# Patient Record
Sex: FEMALE | Race: WHITE | Employment: FULL TIME | URBAN - METROPOLITAN AREA
[De-identification: names, ages, dates, MRNs, and addresses within clinical notes are randomized per-mention and may not be internally consistent; named-entity substitution may affect disease eponyms.]

---

## 2019-02-06 ENCOUNTER — OFFICE VISIT (OUTPATIENT)
Dept: OBGYN CLINIC | Age: 24
End: 2019-02-06

## 2019-02-06 VITALS
WEIGHT: 139 LBS | HEIGHT: 64 IN | DIASTOLIC BLOOD PRESSURE: 80 MMHG | BODY MASS INDEX: 23.73 KG/M2 | SYSTOLIC BLOOD PRESSURE: 110 MMHG

## 2019-02-06 DIAGNOSIS — N87.1 DYSPLASIA OF CERVIX, HIGH GRADE CIN 2: Primary | ICD-10-CM

## 2019-02-06 NOTE — PROGRESS NOTES
Consult for Abnormal Pap    CC: Abnormal Pap Smear    HPI: Ms. Salvatore Parks is a 26 y.o. female presenting with a history of CINII, records reviewed. Onset: 0  Location: cervix  Quality: none  Severity: none  Timing: none  Context: none  Modifying factors: none  Associated symptoms: none      Ob/Gyn Hx:    Patients last menstrual period was 2019. Menses: Regular monthly cycles? no, last 2 days, passage of clots? no, intermenstrual spotting? no.  Contraception: IUD  STI- Denies  SA- yes    Health maintenance:  Pap- abnormal 6 months ago  Gardasil- 3/3 injections per patient  Mammo- not of age  Colonoscopy- not of age    Past Medical History:   Diagnosis Date    Abnormal Pap smear of cervix        History reviewed. No pertinent surgical history. History reviewed. No pertinent family history.     Social History     Socioeconomic History    Marital status:      Spouse name: Not on file    Number of children: Not on file    Years of education: Not on file    Highest education level: Not on file   Social Needs    Financial resource strain: Not on file    Food insecurity - worry: Not on file    Food insecurity - inability: Not on file    Transportation needs - medical: Not on file   [employer]you needs - non-medical: Not on file   Occupational History    Not on file   Tobacco Use    Smoking status: Never Smoker    Smokeless tobacco: Never Used   Substance and Sexual Activity    Alcohol use: No     Frequency: Never    Drug use: No    Sexual activity: No     Partners: Male     Birth control/protection: IUD   Other Topics Concern    Not on file   Social History Narrative    Not on file           No Known Allergies    Review of Systems - History obtained from the patient negative for:  Constitutional: weight loss, fever, night sweats  HEENT: hearing loss, earache, congestion, snoring, sorethroat  CV: chest pain, palpitations, edema  Resp: cough, shortness of breath, wheezing  Breast: breast lumps, nipple discharge, galactorrhea  GI: change in bowel habits, abdominal pain, black or bloody stools  : frequency, dysuria, hematuria, vaginal discharge  MSK: back pain, joint pain, muscle pain  Skin: itching, rash, hives  Neuro: dizziness, headache, confusion, weakness  Psych: anxiety, depression, change in mood  Heme/lymph: bleeding, bruising, pallor      Visit Vitals  /80 (BP 1 Location: Left arm, BP Patient Position: Sitting)   Ht 5' 4\" (1.626 m)   Wt 139 lb (63 kg)   LMP 01/01/2019   BMI 23.86 kg/m²         PHYSICAL EXAMINATION    Constitutional  · Appearance: well-nourished, well developed, alert, in no acute distress    HENT  · Head and Face: appears normal    Neck  · Inspection/Palpation: normal appearance, no masses or tenderness  · Lymph Nodes: no lymphadenopathy present  · Thyroid: gland size normal, nontender, no nodules or masses present on palpation    Chest  · Respiratory Effort: breathing non-labored  · Auscultation: normal breath sounds    Cardiovascular  · Heart:  · Auscultation: regular rate and rhythm without murmur    Gastrointestinal  · Abdominal Examination: abdomen non-tender to palpation, normal bowel sounds, no masses present  · Liver and spleen: no hepatomegaly present, spleen not palpable  · Hernias: no hernias identified    Genitourinary  · External Genitalia: normal appearance for age, no discharge present, no tenderness present, no inflammatory lesions present, no masses present, no atrophy present  · Vagina: normal vaginal vault without central or paravaginal defects, no discharge present, no inflammatory lesions present, no masses present  · Bladder: non-tender to palpation  · Urethra: appears normal  · Cervix: normal  STRINGS PRESENT  · Uterus: normal size, shape and consistency  · Adnexa: no adnexal tenderness present, no adnexal masses present  · Perineum: perineum within normal limits, no evidence of trauma, no rashes or skin lesions present  · Anus: anus within normal limits, no hemorrhoids present  · Inguinal Lymph Nodes: no lymphadenopathy present    Skin  · General Inspection: no rash, no lesions identified    Neurologic/Psychiatric  · Mental Status:  · Orientation: grossly oriented to person, place and time  · Mood and Affect: mood normal, affect appropriate      UPT neg    Assessment/Plan:   Hardik Oneal is a 26 y.o. female presenting with a cervical abnormality with CIN2  She is no longer smoking  Got Gardasil x3      Colposcopy Procedure Note    Chart reviewed for the following:  Laura JARRETT, have reviewed the History, Physical and updated the Allergic reactions for Hardik Oneal. TIME OUT performed immediately prior to start of procedure:  Laura JARRETT, have performed the following reviews on Hardik Oneal prior to the start of the procedure:  * Patient was identified by name and date of birth   * Agreement on procedure being performed was verified  * Risks and Benefits explained to the patient  * Consent was signed and verified     Date of procedure: 2/6/2019  Time: 1500  Procedure performed by: Laura Nation  How tolerated by patient: tolerated the procedure well with no complications  Post Procedural Pain Scale: 0 - No Hurt  Comments: None    Indications: Hardik Oneal is a [de-identified] ,  26 y.o. female PATIENT REFUSED, whose lmp was 1/1/2019. She presents for colposcopy for evaluation of CIN2. After being presented with the risks, benefits and alternatives has she signed a consent for the procedure. She states that she understands the need for the procedure and has no further questions. She was informed that she may experience discomfort. Procedure: She was positioned in the dorsal lithotomy position and a speculum was inserted into the vagina. Dilute acetic acid was applied to the cervix. The colposcope was used to visualize the cervix. The transformation zone was completely visualized.  Biopsies were taken from the cervix, placed in formalin, and sent to pathology. Endocervical curettage was performed. Monsels was applied to the cervix, hemostasis was noted. Findings: This colposcopy was satisfactory. Acetowhite changes were seen throughout. Biopsies were sent from 6 and 12 o'clock. Post Procedure Status: The patient tolerated the procedure well with minimal discomfort. She was observed for 5 minutes and discharged in stable condition. She will be contacted with results/if further treatment is needed.     Signed By: Cameron Sarmiento     February 6, 2019        Dr. Carmen Olivarez MD

## 2019-02-07 LAB
HCG URINE, QL. (POC): NEGATIVE
VALID INTERNAL CONTROL?: YES

## 2019-02-08 LAB
CYTOLOGIST CVX/VAG CYTO: NORMAL
CYTOLOGY CVX/VAG DOC THIN PREP: NORMAL
DX ICD CODE: NORMAL
LABCORP, 190119: NORMAL
Lab: NORMAL
Lab: NORMAL
OTHER STN SPEC: NORMAL
PATH REPORT.FINAL DX SPEC: NORMAL
STAT OF ADQ CVX/VAG CYTO-IMP: NORMAL

## 2019-02-11 ENCOUNTER — TELEPHONE (OUTPATIENT)
Dept: OBGYN CLINIC | Age: 24
End: 2019-02-11

## 2019-02-11 NOTE — TELEPHONE ENCOUNTER
MD Marilee Laguerre LPN   Caller: Unspecified (Today, 11:59 AM)             Its monsels, no appointment needed

## 2019-02-11 NOTE — TELEPHONE ENCOUNTER
Patient calling stating that she had a colposcopy done on 2/6/19 and when she was taking a shower this morning, she noticed a 4 inch long piece of tissue fell out of her vagina. She reports she picked it up, and states it was yellowish and white in color with black edges. Patient explained monsel's and that it may have been a scab that formed on the outside of the cervix after taking the biopsies and patient states that what she felt was not hard, did not feel like a scab and was kind of \"squishy\" like tissue. She is concerned and wants to know if she should be seen. Please advise.

## 2019-02-12 LAB
DX ICD CODE: NORMAL
DX ICD CODE: NORMAL
PATH REPORT.COMMENTS IMP SPEC: NORMAL
PATH REPORT.FINAL DX SPEC: NORMAL
PATH REPORT.GROSS SPEC: NORMAL
PATH REPORT.RELEVANT HX SPEC: NORMAL
PATH REPORT.SITE OF ORIGIN SPEC: NORMAL
PATHOLOGIST NAME: NORMAL
PAYMENT PROCEDURE: NORMAL

## 2019-02-13 ENCOUNTER — TELEPHONE (OUTPATIENT)
Dept: OBGYN CLINIC | Age: 24
End: 2019-02-13

## 2019-02-13 NOTE — PROGRESS NOTES
This RN advised the patient of MD reviewed labs and recommendations. Patient placed on the schedule to be seen and discuss next steps for 2/14/19 isela 10:20am    Patient advised to go to our web site for details regarding the LEEP . Patient verbalized understanding.

## 2019-02-13 NOTE — TELEPHONE ENCOUNTER
MD Diego Perez Mervyn Short, RN   Caller: Unspecified (Today,  1:06 PM)             RN is taking care if this

## 2019-02-13 NOTE — TELEPHONE ENCOUNTER
Call received at 303PM    26year old patient last seen in the office on 2/6/19. Patient states she was asked if she was tested for HPV,    Patient states she found out she has not been tested for HIV. Please review and advise regarding surgical pathology.     Patient wondering is she needs to come back in to be tested for HPV      Please advise

## 2019-02-13 NOTE — PROGRESS NOTES
Notify TARUN 2 on colpo biopsies (meaning HPV is still present), can she come in the office to discuss LEEP procedure

## 2019-02-14 ENCOUNTER — OFFICE VISIT (OUTPATIENT)
Dept: OBGYN CLINIC | Age: 24
End: 2019-02-14

## 2019-02-14 DIAGNOSIS — Z71.89 SURGICAL COUNSELING VISIT: ICD-10-CM

## 2019-02-14 DIAGNOSIS — N87.1 DYSPLASIA OF CERVIX, HIGH GRADE CIN 2: Primary | ICD-10-CM

## 2019-02-14 NOTE — PROGRESS NOTES
Discussed NILM but persistent CIN2 on biopsies. Discussed options per [facility] to repeat colpo and pap in 6 months versus LEEP now. After long discussion of risks, benefits of both, she elects for LEEP. The details of planned LEEP were explained. We discussed the risks, benefits, advantages, disadvantages, and alternatives to surgery. She understands that minimally invasive techniques will be attempted, but possible conversion to more invasive techniques may be necessary. She understands and accepts the possible risks and possible complications of planned surgery, including but not limited to infection, bleeding, damage to surrounding structures requiring repair (including but not limited to bowel, bladder, uterus, fallopian tubes, blood vessels, and ovaries) as well as risks to anesthesia. She understands risks of needing additional procedures. She understands risks of postoperative adhesions. She understands risks of postoperative pain. She understands and accepts the risk of surgical complications, including the remote risk of death or serious disability-that exists with any surgical procedure. She understands that surgery does not guarantee alleviation of symptoms. She has reasonable postoperative expectations, expected hospital course, and anticipated recovery time. I have answered all her questions at this time. Plan to proceed with surgery (our surgery scheduler will contact patient to schedule surgery). Signed By: Tony Rico MD     February 14, 2019      Visit time 15min. More than 50% of my face-to-face time was spend on patient counseling.

## 2019-02-14 NOTE — PATIENT INSTRUCTIONS
Loop Electrosurgical Excision Procedure (LEEP): Before Your Procedure  What is LEEP? Loop electrosurgical excision procedure (LEEP) removes tissue from the cervix. You may have this done if you've had a Pap test that shows tissue that isn't normal.  During LEEP, your doctor will put a tool called a speculum into your vagina. It gently spreads apart the sides of your vagina. This lets your doctor see the cervix and inside the vagina. A special fluid is sometimes put on your cervix to make certain areas easier to see. You may get a shot of medicine to numb the cervix. You may feel a cramp when you have the shot. You may also get pain medicine. Your doctor will put a device with a fine wire loop into your vagina. The doctor uses the heated wire to cut out tissue. You may have mild cramps for several hours after LEEP. A dark brown discharge during the first week is normal. You may have some spotting for about 3 weeks. You should be able to go back to your normal routine in 1 to 3 days. How long it takes you to recover will depend on how much was done. Follow-up care is a key part of your treatment and safety. Be sure to make and go to all appointments, and call your doctor if you are having problems. It's also a good idea to know your test results and keep a list of the medicines you take. What happens before the procedure?   Preparing for the procedure    · Tell your doctor if:  ? You are having your menstrual period. ? You are or might be pregnant. A blood or urine test may be done to see if you are pregnant.     · Do not douche, use tampons, have sexual intercourse, or use vaginal medicines for 24 hours before the test.     · Understand exactly what procedure is planned, along with the risks, benefits, and other options. · Tell your doctors ALL the medicines, vitamins, supplements, and herbal remedies you take.  Some of these can increase the risk of bleeding or interact with anesthesia.     · If you take blood thinners, such as warfarin (Coumadin), clopidogrel (Plavix), or aspirin, be sure to talk to your doctor. He or she will tell you if you should stop taking these medicines before your procedure. Make sure that you understand exactly what your doctor wants you to do.     · Your doctor will tell you which medicines to take or stop before your procedure. You may need to stop taking certain medicines a week or more before the procedure. So talk to your doctor as soon as you can. Procedures can be stressful. This information will help you understand what you can expect. And it will help you safely prepare for your procedure. What happens on the day of the procedure?    · You may eat or drink as you normally do.     · Take a bath or shower before you come in for your procedure. Do not apply lotions, perfumes, deodorants, or nail polish.     · You may want to take a pain reliever, such as ibuprofen (Advil or Motrin), 30 to 60 minutes before you have the procedure. This can help reduce any cramping pain.     · Take off all jewelry and piercings. And take out contact lenses, if you wear them.    At the doctor's office or hospital   · Bring a picture ID.     · You will be kept comfortable and safe by your anesthesia provider. You may get medicine that relaxes you or puts you in a light sleep. The area being worked on will be numb.     · The procedure will take about 15 to 30 minutes. Going home  · You will be given more specific instructions about recovering from your procedure. They will cover things like diet, wound care, follow-up care, driving, and getting back to your normal routine. When should you call your doctor? · You have questions or concerns.     · You don't understand how to prepare for your procedure.     · You become ill before the procedure (such as fever, flu, or a cold).     · You need to reschedule or have changed your mind about having the procedure. Where can you learn more?   Go to http://enrike-april.info/. Enter W669 in the search box to learn more about \"Loop Electrosurgical Excision Procedure (LEEP): Before Your Procedure. \"  Current as of: March 27, 2018  Content Version: 11.9  © 9121-6113 [employer]. Care instructions adapted under license by [employer] (which disclaims liability or warranty for this information). If you have questions about a medical condition or this instruction, always ask your healthcare professional. [place] [address] any warranty or liability for your use of this information.

## 2019-02-26 RX ORDER — BISMUTH SUBSALICYLATE 262 MG
1 TABLET,CHEWABLE ORAL DAILY
COMMUNITY
End: 2019-05-22

## 2019-02-28 ENCOUNTER — TELEPHONE (OUTPATIENT)
Dept: OBGYN CLINIC | Age: 24
End: 2019-02-28

## 2019-02-28 NOTE — TELEPHONE ENCOUNTER
Christiane from Dr. Shahida Rboertson office - PCP - regarding her referral from [facility].   Christiane needed our need address and practice NPI number of the referral.    Information given and she will work on getting the referral.

## 2019-03-04 ENCOUNTER — ANESTHESIA EVENT (OUTPATIENT)
Dept: SURGERY | Age: 24
End: 2019-03-04
Payer: OTHER GOVERNMENT

## 2019-03-05 DIAGNOSIS — N87.1 DYSPLASIA OF CERVIX, HIGH GRADE CIN 2: Primary | ICD-10-CM

## 2019-03-11 ENCOUNTER — HOSPITAL ENCOUNTER (OUTPATIENT)
Age: 24
Setting detail: OUTPATIENT SURGERY
Discharge: HOME | End: 2019-03-11
Attending: OBSTETRICS & GYNECOLOGY | Admitting: OBSTETRICS & GYNECOLOGY
Payer: OTHER GOVERNMENT

## 2019-03-11 ENCOUNTER — ANESTHESIA (OUTPATIENT)
Dept: SURGERY | Age: 24
End: 2019-03-11
Payer: OTHER GOVERNMENT

## 2019-03-11 VITALS
SYSTOLIC BLOOD PRESSURE: 108 MMHG | DIASTOLIC BLOOD PRESSURE: 66 MMHG | TEMPERATURE: 98 F | OXYGEN SATURATION: 96 % | WEIGHT: 138.01 LBS | RESPIRATION RATE: 14 BRPM | HEIGHT: 64 IN | BODY MASS INDEX: 23.56 KG/M2 | HEART RATE: 67 BPM

## 2019-03-11 DIAGNOSIS — N87.1 DYSPLASIA OF CERVIX, HIGH GRADE CIN 2: ICD-10-CM

## 2019-03-11 LAB
HCG UR QL: NEGATIVE
HGB BLD-MCNC: 13.3 G/DL (ref 11.5–16)

## 2019-03-11 PROCEDURE — 85018 HEMOGLOBIN: CPT

## 2019-03-11 PROCEDURE — 74011250636 HC RX REV CODE- 250/636

## 2019-03-11 PROCEDURE — 74011000250 HC RX REV CODE- 250: Performed by: OBSTETRICS & GYNECOLOGY

## 2019-03-11 PROCEDURE — 76060000032 HC ANESTHESIA 0.5 TO 1 HR: Performed by: OBSTETRICS & GYNECOLOGY

## 2019-03-11 PROCEDURE — 77030031139 HC SUT VCRL2 J&J -A: Performed by: OBSTETRICS & GYNECOLOGY

## 2019-03-11 PROCEDURE — 74011250636 HC RX REV CODE- 250/636: Performed by: ANESTHESIOLOGY

## 2019-03-11 PROCEDURE — 77030005537 HC CATH URETH BARD -A: Performed by: OBSTETRICS & GYNECOLOGY

## 2019-03-11 PROCEDURE — 77030009408 HC ELECTRD LP RND UTMD -B: Performed by: OBSTETRICS & GYNECOLOGY

## 2019-03-11 PROCEDURE — 77030018722 HC ELCTRD BALL LEEP UTMD -B: Performed by: OBSTETRICS & GYNECOLOGY

## 2019-03-11 PROCEDURE — 76010000138 HC OR TIME 0.5 TO 1 HR: Performed by: OBSTETRICS & GYNECOLOGY

## 2019-03-11 PROCEDURE — 76210000006 HC OR PH I REC 0.5 TO 1 HR: Performed by: OBSTETRICS & GYNECOLOGY

## 2019-03-11 PROCEDURE — 77030020782 HC GWN BAIR PAWS FLX 3M -B

## 2019-03-11 PROCEDURE — 77030018724 HC ELCTRD LP RND UTMD -B: Performed by: OBSTETRICS & GYNECOLOGY

## 2019-03-11 PROCEDURE — 77030011640 HC PAD GRND REM COVD -A: Performed by: OBSTETRICS & GYNECOLOGY

## 2019-03-11 PROCEDURE — 77030016378 HC TBNG SMK EVAC SRGM -A: Performed by: OBSTETRICS & GYNECOLOGY

## 2019-03-11 PROCEDURE — 77030018836 HC SOL IRR NACL ICUM -A: Performed by: OBSTETRICS & GYNECOLOGY

## 2019-03-11 PROCEDURE — 77030002996 HC SUT SLK J&J -A: Performed by: OBSTETRICS & GYNECOLOGY

## 2019-03-11 PROCEDURE — 88307 TISSUE EXAM BY PATHOLOGIST: CPT

## 2019-03-11 PROCEDURE — 81025 URINE PREGNANCY TEST: CPT

## 2019-03-11 PROCEDURE — 77030032490 HC SLV COMPR SCD KNE COVD -B: Performed by: OBSTETRICS & GYNECOLOGY

## 2019-03-11 PROCEDURE — 76210000021 HC REC RM PH II 0.5 TO 1 HR: Performed by: OBSTETRICS & GYNECOLOGY

## 2019-03-11 RX ORDER — HYDROCODONE BITARTRATE AND ACETAMINOPHEN 5; 325 MG/1; MG/1
1 TABLET ORAL
Qty: 5 TAB | Refills: 0 | Status: SHIPPED | OUTPATIENT
Start: 2019-03-11 | End: 2019-03-13

## 2019-03-11 RX ORDER — FENTANYL CITRATE 50 UG/ML
25 INJECTION, SOLUTION INTRAMUSCULAR; INTRAVENOUS
Status: DISCONTINUED | OUTPATIENT
Start: 2019-03-11 | End: 2019-03-11 | Stop reason: HOSPADM

## 2019-03-11 RX ORDER — DEXAMETHASONE SODIUM PHOSPHATE 4 MG/ML
INJECTION, SOLUTION INTRA-ARTICULAR; INTRALESIONAL; INTRAMUSCULAR; INTRAVENOUS; SOFT TISSUE AS NEEDED
Status: DISCONTINUED | OUTPATIENT
Start: 2019-03-11 | End: 2019-03-11 | Stop reason: HOSPADM

## 2019-03-11 RX ORDER — ACETAMINOPHEN 10 MG/ML
INJECTION, SOLUTION INTRAVENOUS AS NEEDED
Status: DISCONTINUED | OUTPATIENT
Start: 2019-03-11 | End: 2019-03-11 | Stop reason: HOSPADM

## 2019-03-11 RX ORDER — SODIUM CHLORIDE, SODIUM LACTATE, POTASSIUM CHLORIDE, CALCIUM CHLORIDE 600; 310; 30; 20 MG/100ML; MG/100ML; MG/100ML; MG/100ML
125 INJECTION, SOLUTION INTRAVENOUS CONTINUOUS
Status: DISCONTINUED | OUTPATIENT
Start: 2019-03-11 | End: 2019-03-11 | Stop reason: HOSPADM

## 2019-03-11 RX ORDER — OXYCODONE HYDROCHLORIDE 5 MG/1
5 TABLET ORAL AS NEEDED
Status: DISCONTINUED | OUTPATIENT
Start: 2019-03-11 | End: 2019-03-11 | Stop reason: HOSPADM

## 2019-03-11 RX ORDER — DEXTROSE, SODIUM CHLORIDE, SODIUM LACTATE, POTASSIUM CHLORIDE, AND CALCIUM CHLORIDE 5; .6; .31; .03; .02 G/100ML; G/100ML; G/100ML; G/100ML; G/100ML
125 INJECTION, SOLUTION INTRAVENOUS CONTINUOUS
Status: DISCONTINUED | OUTPATIENT
Start: 2019-03-11 | End: 2019-03-11 | Stop reason: HOSPADM

## 2019-03-11 RX ORDER — ONDANSETRON 2 MG/ML
INJECTION INTRAMUSCULAR; INTRAVENOUS AS NEEDED
Status: DISCONTINUED | OUTPATIENT
Start: 2019-03-11 | End: 2019-03-11 | Stop reason: HOSPADM

## 2019-03-11 RX ORDER — PROPOFOL 10 MG/ML
INJECTION, EMULSION INTRAVENOUS AS NEEDED
Status: DISCONTINUED | OUTPATIENT
Start: 2019-03-11 | End: 2019-03-11 | Stop reason: HOSPADM

## 2019-03-11 RX ORDER — SODIUM CHLORIDE 0.9 % (FLUSH) 0.9 %
5-40 SYRINGE (ML) INJECTION EVERY 8 HOURS
Status: DISCONTINUED | OUTPATIENT
Start: 2019-03-11 | End: 2019-03-11 | Stop reason: HOSPADM

## 2019-03-11 RX ORDER — MORPHINE SULFATE 10 MG/ML
2 INJECTION, SOLUTION INTRAMUSCULAR; INTRAVENOUS
Status: DISCONTINUED | OUTPATIENT
Start: 2019-03-11 | End: 2019-03-11 | Stop reason: HOSPADM

## 2019-03-11 RX ORDER — MIDAZOLAM HYDROCHLORIDE 1 MG/ML
1 INJECTION, SOLUTION INTRAMUSCULAR; INTRAVENOUS
Status: DISCONTINUED | OUTPATIENT
Start: 2019-03-11 | End: 2019-03-11 | Stop reason: HOSPADM

## 2019-03-11 RX ORDER — IBUPROFEN 800 MG/1
800 TABLET ORAL
Qty: 40 TAB | Refills: 1 | Status: SHIPPED | OUTPATIENT
Start: 2019-03-11 | End: 2019-05-22

## 2019-03-11 RX ORDER — DIPHENHYDRAMINE HYDROCHLORIDE 50 MG/ML
12.5 INJECTION, SOLUTION INTRAMUSCULAR; INTRAVENOUS AS NEEDED
Status: DISCONTINUED | OUTPATIENT
Start: 2019-03-11 | End: 2019-03-11 | Stop reason: HOSPADM

## 2019-03-11 RX ORDER — MIDAZOLAM HYDROCHLORIDE 1 MG/ML
1 INJECTION, SOLUTION INTRAMUSCULAR; INTRAVENOUS AS NEEDED
Status: DISCONTINUED | OUTPATIENT
Start: 2019-03-11 | End: 2019-03-11 | Stop reason: HOSPADM

## 2019-03-11 RX ORDER — MIDAZOLAM HYDROCHLORIDE 1 MG/ML
INJECTION, SOLUTION INTRAMUSCULAR; INTRAVENOUS AS NEEDED
Status: DISCONTINUED | OUTPATIENT
Start: 2019-03-11 | End: 2019-03-11 | Stop reason: HOSPADM

## 2019-03-11 RX ORDER — FENTANYL CITRATE 50 UG/ML
INJECTION, SOLUTION INTRAMUSCULAR; INTRAVENOUS AS NEEDED
Status: DISCONTINUED | OUTPATIENT
Start: 2019-03-11 | End: 2019-03-11 | Stop reason: HOSPADM

## 2019-03-11 RX ORDER — SODIUM CHLORIDE, SODIUM LACTATE, POTASSIUM CHLORIDE, CALCIUM CHLORIDE 600; 310; 30; 20 MG/100ML; MG/100ML; MG/100ML; MG/100ML
INJECTION, SOLUTION INTRAVENOUS
Status: DISCONTINUED | OUTPATIENT
Start: 2019-03-11 | End: 2019-03-11 | Stop reason: HOSPADM

## 2019-03-11 RX ORDER — FENTANYL CITRATE 50 UG/ML
50 INJECTION, SOLUTION INTRAMUSCULAR; INTRAVENOUS AS NEEDED
Status: DISCONTINUED | OUTPATIENT
Start: 2019-03-11 | End: 2019-03-11 | Stop reason: HOSPADM

## 2019-03-11 RX ORDER — LIDOCAINE HYDROCHLORIDE 10 MG/ML
0.5 INJECTION, SOLUTION EPIDURAL; INFILTRATION; INTRACAUDAL; PERINEURAL AS NEEDED
Status: DISCONTINUED | OUTPATIENT
Start: 2019-03-11 | End: 2019-03-11 | Stop reason: HOSPADM

## 2019-03-11 RX ORDER — SODIUM CHLORIDE 0.9 % (FLUSH) 0.9 %
5-40 SYRINGE (ML) INJECTION AS NEEDED
Status: DISCONTINUED | OUTPATIENT
Start: 2019-03-11 | End: 2019-03-11 | Stop reason: HOSPADM

## 2019-03-11 RX ORDER — ONDANSETRON 2 MG/ML
4 INJECTION INTRAMUSCULAR; INTRAVENOUS AS NEEDED
Status: DISCONTINUED | OUTPATIENT
Start: 2019-03-11 | End: 2019-03-11 | Stop reason: HOSPADM

## 2019-03-11 RX ORDER — KETOROLAC TROMETHAMINE 30 MG/ML
INJECTION, SOLUTION INTRAMUSCULAR; INTRAVENOUS AS NEEDED
Status: DISCONTINUED | OUTPATIENT
Start: 2019-03-11 | End: 2019-03-11 | Stop reason: HOSPADM

## 2019-03-11 RX ORDER — FERRIC SUBSULFATE 20-22G/100
SOLUTION, NON-ORAL MISCELLANEOUS AS NEEDED
Status: DISCONTINUED | OUTPATIENT
Start: 2019-03-11 | End: 2019-03-11 | Stop reason: HOSPADM

## 2019-03-11 RX ORDER — LIDOCAINE HYDROCHLORIDE 20 MG/ML
INJECTION, SOLUTION EPIDURAL; INFILTRATION; INTRACAUDAL; PERINEURAL AS NEEDED
Status: DISCONTINUED | OUTPATIENT
Start: 2019-03-11 | End: 2019-03-11 | Stop reason: HOSPADM

## 2019-03-11 RX ADMIN — DEXAMETHASONE SODIUM PHOSPHATE 6 MG: 4 INJECTION, SOLUTION INTRA-ARTICULAR; INTRALESIONAL; INTRAMUSCULAR; INTRAVENOUS; SOFT TISSUE at 07:40

## 2019-03-11 RX ADMIN — SODIUM CHLORIDE, SODIUM LACTATE, POTASSIUM CHLORIDE, CALCIUM CHLORIDE: 600; 310; 30; 20 INJECTION, SOLUTION INTRAVENOUS at 07:21

## 2019-03-11 RX ADMIN — FENTANYL CITRATE 25 MCG: 50 INJECTION INTRAMUSCULAR; INTRAVENOUS at 08:33

## 2019-03-11 RX ADMIN — FENTANYL CITRATE 25 MCG: 50 INJECTION INTRAMUSCULAR; INTRAVENOUS at 08:19

## 2019-03-11 RX ADMIN — ONDANSETRON 4 MG: 2 INJECTION INTRAMUSCULAR; INTRAVENOUS at 08:02

## 2019-03-11 RX ADMIN — MIDAZOLAM HYDROCHLORIDE 2 MG: 1 INJECTION, SOLUTION INTRAMUSCULAR; INTRAVENOUS at 07:21

## 2019-03-11 RX ADMIN — PROPOFOL 150 MG: 10 INJECTION, EMULSION INTRAVENOUS at 07:31

## 2019-03-11 RX ADMIN — KETOROLAC TROMETHAMINE 30 MG: 30 INJECTION, SOLUTION INTRAMUSCULAR; INTRAVENOUS at 08:01

## 2019-03-11 RX ADMIN — PROPOFOL 30 MG: 10 INJECTION, EMULSION INTRAVENOUS at 07:21

## 2019-03-11 RX ADMIN — ACETAMINOPHEN 1000 MG: 10 INJECTION, SOLUTION INTRAVENOUS at 07:29

## 2019-03-11 RX ADMIN — FENTANYL CITRATE 50 MCG: 50 INJECTION, SOLUTION INTRAMUSCULAR; INTRAVENOUS at 07:40

## 2019-03-11 RX ADMIN — SODIUM CHLORIDE, SODIUM LACTATE, POTASSIUM CHLORIDE, AND CALCIUM CHLORIDE 125 ML/HR: 600; 310; 30; 20 INJECTION, SOLUTION INTRAVENOUS at 06:30

## 2019-03-11 RX ADMIN — FENTANYL CITRATE 50 MCG: 50 INJECTION, SOLUTION INTRAMUSCULAR; INTRAVENOUS at 07:30

## 2019-03-11 RX ADMIN — LIDOCAINE HYDROCHLORIDE 40 MG: 20 INJECTION, SOLUTION EPIDURAL; INFILTRATION; INTRACAUDAL; PERINEURAL at 07:31

## 2019-03-11 NOTE — ROUTINE PROCESS
Patient: Miguel Dee MRN: [id number]  SSN: [id number]   YOB: 1995  Age: 26 y.o. Sex: female     Patient is status post Procedure(s):  LOOP ELECTRODE EXCISION PROCEDURE OF CERVIX LEEP IUD REMOVAL.     Surgeon(s) and Role:     * Dae Kidd MD - Primary    Local/Dose/Irrigation:                   Peripheral IV 03/11/19 Left Arm (Active)   Dressing Status Clean, dry, & intact 3/11/2019  6:25 AM   Dressing Type Transparent 3/11/2019  6:25 AM   Hub Color/Line Status Infusing 3/11/2019  6:25 AM            Airway - Endotracheal Tube 03/11/19 (Active)                   Dressing/Packing:  Wound Perineum-Dressing Type : Alecia-pad (03/11/19 0700)  Splint/Cast:  ]    Other:

## 2019-03-11 NOTE — H&P
Gynecology History and Physical    Name: Kenna Arceo MRN: [id number] [id number]: [id number]    YOB: 1995  Age: 26 y.o. Sex: female       Subjective:      Chief complaint:  TARUN 2    Reza is a 26 y.o.  female with a history of TARUN 2. She is admitted for Procedure(s) (LRB):  LOOP ELECTRODE EXCISION PROCEDURE OF CERVIX LEEP (N/A). OB History      Para Term  AB Living    0 0 0 0 0 0    SAB TAB Ectopic Molar Multiple Live Births    0 0 0 0 0 0        Past Medical History:   Diagnosis Date    Abnormal Pap smear of cervix     Ill-defined condition     DEPRESSION AND ANXIETY    Ill-defined condition     LOWER BACK ISSUES     History reviewed. No pertinent surgical history. Social History     Occupational History    Not on file   Tobacco Use    Smoking status: Former Smoker     Last attempt to quit: 10/2018     Years since quittin.4    Smokeless tobacco: Never Used   Substance and Sexual Activity    Alcohol use: No     Frequency: Never    Drug use: No    Sexual activity: No     Partners: Male     Birth control/protection: IUD     Family History   Problem Relation Age of Onset    No Known Problems Mother     No Known Problems Father     No Known Problems Sister     No Known Problems Sister         No Known Allergies  Prior to Admission medications    Medication Sig Start Date End Date Taking? Authorizing Provider   multivitamin (ONE A DAY) tablet Take 1 Tab by mouth daily. Yes Provider, Historical        Review of Systems:  A comprehensive review of systems was negative except for that written in the History of Present Illness. Objective:     Vitals:    19 1544 19 0612   BP:  107/63   Pulse:  82   Resp:  12   Temp:  98.7 °F (37.1 °C)   SpO2:  98%   Weight: 138 lb (62.6 kg) 138 lb 0.1 oz (62.6 kg)   Height: 5' 4\" (1.626 m) 5' 4\" (1.626 m)       Physical Exam:  Patient without distress.   Heart: Regular rate and rhythm or S1S2 present  Lung: clear to auscultation throughout lung fields, no wheezes, no rales, no rhonchi and normal respiratory effort  Abdomen: soft, nontender    Assessment:     TARUN 2    Plan:     Procedure(s) (LRB):  LOOP ELECTRODE EXCISION PROCEDURE OF CERVIX LEEP (N/A)  Discussed the risks of surgery including the risks of bleeding, infection, deep vein thrombosis, and surgical injuries to internal organs including but not limited to the bowels, bladder, rectum, and female reproductive organs. The patient understands the risks; any and all questions were answered to the patient's satisfaction.     Signed By:  Thomas Burton MD     March 11, 2019

## 2019-03-11 NOTE — ANESTHESIA PREPROCEDURE EVALUATION
Anesthetic History   No history of anesthetic complications            Review of Systems / Medical History  Patient summary reviewed, nursing notes reviewed and pertinent labs reviewed    Pulmonary  Within defined limits        Smoker         Neuro/Psych   Within defined limits           Cardiovascular  Within defined limits                     GI/Hepatic/Renal  Within defined limits              Endo/Other  Within defined limits           Other Findings              Physical Exam    Airway  Mallampati: II  TM Distance: > 6 cm  Neck ROM: normal range of motion   Mouth opening: Normal     Cardiovascular  Regular rate and rhythm,  S1 and S2 normal,  no murmur, click, rub, or gallop             Dental  No notable dental hx       Pulmonary  Breath sounds clear to auscultation               Abdominal  GI exam deferred       Other Findings            Anesthetic Plan    ASA: 2  Anesthesia type: general          Induction: Intravenous  Anesthetic plan and risks discussed with: Patient

## 2019-03-11 NOTE — PROCEDURES
LEEP Operative Note    Date of Procedure: 3/11/2019   Preoperative Diagnosis: DYSPLASIA OF CERVIX , HIGHGRADE CIN2  Postoperative Diagnosis: DYSPLASIA OF CERVIX , HIGHGRADE CIN2    Procedure(s):  LOOP ELECTRODE EXCISION PROCEDURE OF CERVIX (LEEP) and IUD REMOVAL  Surgeon(s) and Role:     * Linda Murguia MD - Primary         Assistant Staff: None  Surgical Staff:  Circ-1: Joshua Espino RN  Scrub Tech-1: Rose Marie GRAHAM  Event Time In Time Out   Incision Start 3450    Incision Close 0802        Findings: normal appearing cervix, IUD strings at cervical os  Anesthesia: General   Complications: none  Estimated Blood Loss: 5cc  Specimens:   ID Type Source Tests Collected by Time Destination   1 : CERVICAL EXCISION  Fresh Cervical  Linda Murguia MD 3/11/2019 5908 Pathology      Implants: None    Procedure Detail:  The patient was taken to the operating room where she was placed in the supine position. After undergoing adequate anesthesia, she was placed up in the Yellow-fin stirrups in the dorsal lithotomy position. The patient was then prepped and draped in the usual fashion. Time-out was performed. I&O catheter was used to drain the bladder.      Attention was first turned to the vagina where insulated speculum was placed. Cervix was visualized, lugols was applied. The anterior lip of the cervix was grasped with a single tooth tenaculum. Attempts were made to displace IUD strings but were unsuccessful. Ring forceps were used to grasp IUD strings and remove IUD. Using a loop electrode that encompassed the entire lesion, excision of the ectocervix was done with every effort made to remove the entire lesion in one specimen. Rollerball cautery was used to obtain hemostasis. Monsels was applied.     All instruments were removed from the vagina including the Paul catheter.  The patient was awakened, extubated and taken to the recovery room in good condition    Signed By: Usama Cartagena MD     March 11, 2019

## 2019-03-11 NOTE — ANESTHESIA POSTPROCEDURE EVALUATION
Procedure(s):  LOOP ELECTRODE EXCISION PROCEDURE OF CERVIX LEEP IUD REMOVAL. Anesthesia Post Evaluation        Patient location during evaluation: PACU  Patient participation: complete - patient participated  Level of consciousness: awake and alert  Pain management: adequate  Airway patency: patent  Anesthetic complications: no  Cardiovascular status: acceptable  Respiratory status: acceptable  Hydration status: acceptable  Comments: I have seen and evaluated the patient and is ready for discharge.  Mignon Gaston MD    Post anesthesia nausea and vomiting:  none      Visit Vitals  /71   Pulse 71   Temp 36.7 °C (98 °F)   Resp 12   Ht 5' 4\" (1.626 m)   Wt 62.6 kg (138 lb 0.1 oz)   SpO2 98%   BMI 23.69 kg/m²

## 2019-03-11 NOTE — DISCHARGE INSTRUCTIONS
Patient Education      You had motrin at 8 am. Next dose is at 4 pm     Loop Electrosurgical Excision Procedure (LEEP): What to Expect at [facility] may have mild cramping for several hours after the procedure. A dark brown vaginal discharge during the first week is normal. You can use a sanitary pad for the bleeding. You may also have some spotting for about 3 weeks. How long it takes to recover will depend on how much was done during the procedure. This care sheet gives you a general idea about how long it will take for you to recover. But each person recovers at a different pace. Follow the steps below to feel better as quickly as possible. How can you care for yourself at home? Activity    · You should be able to go back to your normal activities in 1 to 3 days. Medicines    · Your doctor will tell you if and when you can restart your medicines. He or she will also give you instructions about taking any new medicines.     · If you take blood thinners, such as warfarin (Coumadin), clopidogrel (Plavix), or aspirin, be sure to talk to your doctor. He or she will tell you if and when to start taking those medicines again. Make sure that you understand exactly what your doctor wants you to do.     · Take an over-the-counter pain medicine, such as acetaminophen (Tylenol), ibuprofen (Advil, Motrin), or naproxen (Aleve). Read and follow all instructions on the label.     · Do not take two or more pain medicines at the same time unless the doctor told you to. Many pain medicines have acetaminophen, which is Tylenol. Too much acetaminophen (Tylenol) can be harmful. Exercise    · Do not exercise for 1 to 3 days after the procedure. Other instructions    · Use a sanitary pad if you have bleeding.     · Do not have sexual intercourse or use tampons for 3 weeks. Do not douche. This will allow your cervix to heal.     · You can take a shower anytime after the procedure.  Ask your doctor when it is okay to take a bath.     · Be sure to have regular follow-up Pap tests. Your doctor can tell you how often you need to have Pap tests. Follow-up care is a key part of your treatment and safety. Be sure to make and go to all appointments, and call your doctor if you are having problems. It's also a good idea to know your test results and keep a list of the medicines you take. When should you call for help? Call 911 anytime you think you may need emergency care. For example, call if:    · You passed out (lost consciousness).     · You have chest pain, are short of breath, or cough up blood.    Call your doctor now or seek immediate medical care if:    · You have pain that does not get better after you take pain medicine.     · You cannot pass stools or gas.     · You have signs of infection, such as:  ? Increased pain, swelling, warmth, or redness. ? A fever.     · You have bright red vaginal bleeding that soaks one or more pads in an hour, or you have large clots.     · You have vaginal discharge that has increased in amount or smells bad.     · You are sick to your stomach or cannot drink fluids.     · You have signs of a blood clot in your leg (called a deep vein thrombosis), such as:  ? Pain in your calf, back of the knee, thigh, or groin. ? Redness or swelling in your leg.    Watch closely for any changes in your health, and be sure to contact your doctor if you have any problems. Where can you learn more? Go to http://enrike-april.info/. Enter ([phone] in the search box to learn more about \"Loop Electrosurgical Excision Procedure (LEEP): What to Expect at Home. \"  Current as of: March 27, 2018  Content Version: 11.9  © 7487-2115 [employer]. Care instructions adapted under license by [employer] (which disclaims liability or warranty for this information).  If you have questions about a medical condition or this instruction, always ask your healthcare professional. [employer] disclaims any warranty or liability for your use of this information. DISCHARGE SUMMARY from Nurse    PATIENT INSTRUCTIONS:    After general anesthesia or intravenous sedation, for 24 hours or while taking prescription Narcotics:  · Limit your activities  · Do not drive and operate hazardous machinery  · Do not make important personal or business decisions  · Do  not drink alcoholic beverages  · If you have not urinated within 8 hours after discharge, please contact your surgeon on call. Report the following to your surgeon:  · Excessive pain, swelling, redness or odor of or around the surgical area  · Temperature over 100.5  · Nausea and vomiting lasting longer than 4 hours or if unable to take medications  · Any signs of decreased circulation or nerve impairment to extremity: change in color, persistent  numbness, tingling, coldness or increase pain  · Any questions    What to do at Home:  Recommended activity: Activity as tolerated and no driving for today and No driving while on analgesics,     If you experience any of the following symptoms fever, foul smelling vaginal drainage, bleeding- filling 2 pads and hour or large clots, please follow up with Dr Missy Romero. *  Please give a list of your current medications to your Primary Care Provider. *  Please update this list whenever your medications are discontinued, doses are      changed, or new medications (including over-the-counter products) are added. *  Please carry medication information at all times in case of emergency situations. These are general instructions for a healthy lifestyle:    No smoking/ No tobacco products/ Avoid exposure to second hand smoke  Surgeon General's Warning:  Quitting smoking now greatly reduces serious risk to your health.     Obesity, smoking, and sedentary lifestyle greatly increases your risk for illness    A healthy diet, regular physical exercise & weight monitoring are important for maintaining a healthy lifestyle    You may be retaining fluid if you have a history of heart failure or if you experience any of the following symptoms:  Weight gain of 3 pounds or more overnight or 5 pounds in a week, increased swelling in our hands or feet or shortness of breath while lying flat in bed. Please call your doctor as soon as you notice any of these symptoms; do not wait until your next office visit. Recognize signs and symptoms of STROKE:    F-face looks uneven    A-arms unable to move or move unevenly    S-speech slurred or non-existent    T-time-call 911 as soon as signs and symptoms begin-DO NOT go       Back to bed or wait to see if you get better-TIME IS BRAIN. Warning Signs of HEART ATTACK     Call 911 if you have these symptoms:   Chest discomfort. Most heart attacks involve discomfort in the center of the chest that lasts more than a few minutes, or that goes away and comes back. It can feel like uncomfortable pressure, squeezing, fullness, or pain.  Discomfort in other areas of the upper body. Symptoms can include pain or discomfort in one or both arms, the back, neck, jaw, or stomach.  Shortness of breath with or without chest discomfort.  Other signs may include breaking out in a cold sweat, nausea, or lightheadedness. Don't wait more than five minutes to call 911 - MINUTES MATTER! Fast action can save your life. Calling 911 is almost always the fastest way to get lifesaving treatment. Emergency Medical Services staff can begin treatment when they arrive -- up to an hour sooner than if someone gets to the hospital by car. The discharge information has been reviewed with the patient. The patient verbalized understanding.   Discharge medications reviewed with the patient and appropriate educational materials and side effects teaching were provided.   ___________________________________________________________________________________________________________________________________

## 2019-03-14 ENCOUNTER — TELEPHONE (OUTPATIENT)
Dept: OBGYN CLINIC | Age: 24
End: 2019-03-14

## 2019-03-20 NOTE — PROGRESS NOTES
Patient advised of MD reviewed labs and recommendations and patient placed on the schedule to have IUD insertion on 4/29/19 at 10:00am    Patient advised to observe no unprotected intercourse for 2 weeks prior to the insertion and to take motrin prior to the procedure. Patient verbalized understanding.

## 2019-03-25 ENCOUNTER — TELEPHONE (OUTPATIENT)
Dept: OBGYN CLINIC | Age: 24
End: 2019-03-25

## 2019-03-25 NOTE — TELEPHONE ENCOUNTER
Patient of Gulshan Ley. She was in the OR for Leep on 3/11/19 and had her IUD removed at the same time. She said that she developed bleeding, as expected after the Leep. She has continued to have bright red bleeding like a period. She has been advised that this may be from the removal of the hormones from the IUD. She said that she has been crampy, bloated, bleeding through a regular absorbancy pad every 24 hours. Bleeding is not heavy. No clots, no pieces of tissue noted. She said that she was given Motrin 800 mg and has only taken one tablet. She and I discussed that she could try taking the Motrin every 8 hours (1 tab) with food around the clock for 72 hours to decrease the pain and bleeding. Anything else I needed to add for her?

## 2019-03-26 NOTE — TELEPHONE ENCOUNTER
Joelle Alvarez MD  [facility] Tommy CADE, LPN 17 minutes ago (0:15 AM)      Agree, thx    Routing comment

## 2019-04-29 ENCOUNTER — OFFICE VISIT (OUTPATIENT)
Dept: OBGYN CLINIC | Age: 24
End: 2019-04-29

## 2019-04-29 VITALS
WEIGHT: 134.38 LBS | SYSTOLIC BLOOD PRESSURE: 110 MMHG | DIASTOLIC BLOOD PRESSURE: 72 MMHG | BODY MASS INDEX: 22.94 KG/M2 | HEIGHT: 64 IN

## 2019-04-29 DIAGNOSIS — Z30.430 ENCOUNTER FOR IUD INSERTION: Primary | ICD-10-CM

## 2019-04-29 DIAGNOSIS — Z30.430 VISIT FOR INSERTION OF INTRAUTERINE DEVICE: ICD-10-CM

## 2019-04-29 LAB
HCG URINE, QL. (POC): NEGATIVE
VALID INTERNAL CONTROL?: YES

## 2019-04-29 NOTE — PROGRESS NOTES
IUD INSERTION NOTE    Chart reviewed for the following:  I have reviewed the History & Physical and updated Reza Severino allergies. TIME OUT performed immediately prior to start of procedure:  I performed the following reviews on Suzie Alba prior to the start of the procedure:  Patient was identified by name and date of birth   Agreement on procedure being performed was verified  Risks and Benefits explained to the patient  Consent was signed and verified  Time: 1030  Date of procedure: 2019  Procedure performed by: Dr. Kusum Wills  How tolerated by patient: Well  Post Procedural Pain Scale: 2-Hurts Minimally  Comments: None    Indications:  Suzie Alba is a 22 y.o., , whose Patient's last menstrual period was 03/15/2019., presents for insertion of an IUD. The risks, benefits and alternatives of IUD insertion were discussed in detail at last visit and at this visit. She also has reviewed IUD information herself. She has elected to proceed with the insertion today and she states she has no further questions. She signed the consent form. A urine pregnancy test was negative. Procedure:  She was placed in dorsal lithotomy position. On bimanual exam the uterus was anteverted and normal in size with no tenderness present. A speculum was inserted into the vagina and the cervix was visualized. The anterior lip of the cervix was grasped with a single toothed tenaculum. The uterus was sounded with a Dietz sound to 7 centimeters. A Mirena IUD was then inserted without difficulty per  instructions. The string was cut to 3 centimeters. She experienced a mild amount of cramping. Post Procedure Status:   She tolerated the procedure with mild discomfort. The patient was observed for 10 minutes after the insertion. There were no complications. Patient was discharged in stable condition. She understands the IUD will not protect from sexually transmitted diseases including HIV.  She also understands she may have heavier/irregular bleeding for for the first 3-6 months with her IUD but that periods generally diminish in amount thereafter. She also understands the IUD must be removed before 5 years from today. She will contact us if she experiences  Any significant or increasing bleeding, pain, fever, shaking, chills or any other concerning signs or symptoms. She will also contact us or seek care immediately if she is or thinks she is pregnant. See order report for IUD lot number.      Tray Barney MD

## 2019-04-29 NOTE — PATIENT INSTRUCTIONS
Intrauterine Device (IUD) Insertion: Care Instructions  Your Care Instructions    The intrauterine device (IUD) is a very effective method of birth control. It is a small, plastic, T-shaped device that contains copper or hormones. The doctor inserts the IUD into your uterus. A plastic string tied to the end of the IUD hangs down through the cervix into the vagina. There are two types of IUDs. The copper IUD is effective for up to 10 years. The hormonal IUD is effective for either 3 years or 5 years, depending on which IUD is used. The hormonal IUD also reduces menstrual bleeding and cramping. Both types of IUD damage or kill the man's sperm. This means that the woman's egg does not join with the sperm. IUDs also change the lining of the uterus so that the egg does not lodge there. The IUD is most likely to work well for women who have been pregnant before. Some women who have never been pregnant have more trouble keeping the IUD in the uterus. They also may have more pain and cramping after insertion. Follow-up care is a key part of your treatment and safety. Be sure to make and go to all appointments, and call your doctor if you are having problems. It's also a good idea to know your test results and keep a list of the medicines you take. How can you care for yourself at home? · You may experience some mild cramping and light bleeding (spotting) for 1 or 2 days. Use a hot water bottle or a heating pad set on low on your belly for pain. · Take an over-the-counter pain medicine, such as acetaminophen (Tylenol), ibuprofen (Advil, Motrin), and naproxen (Aleve) if needed. Read and follow all instructions on the label. · Do not take two or more pain medicines at the same time unless the doctor told you to. Many pain medicines have acetaminophen, which is Tylenol. Too much acetaminophen (Tylenol) can be harmful. · Check the string of your IUD after every period.  To do this, insert a finger into your vagina and feel for the cervix, which is at the top of the vagina and feels harder than the rest of your vagina. You should be able to feel the thin, plastic string coming out of the opening of your cervix. If you cannot feel the string, use another form of birth control and make an appointment with your doctor to have the string checked. · If the IUD comes out, save it and call your doctor. Be sure to use another form of birth control while the IUD is out. · Use latex condoms to protect against sexually transmitted infections (STIs), such as gonorrhea and chlamydia. An IUD does not protect you from STIs. Having one sex partner (who does not have STIs and does not have sex with anyone else) is a good way to avoid STIs. When should you call for help? Call 911 anytime you think you may need emergency care. For example, call if:    · You passed out (lost consciousness).     · You have sudden, severe pain in your belly or pelvis.    Call your doctor now or seek immediate medical care if:    · You have new belly or pelvic pain.     · You have severe vaginal bleeding. This means that you are soaking through your usual pads or tampons each hour for 2 or more hours.     · You are dizzy or lightheaded, or you feel like you may faint.     · You have a fever and pelvic pain or vaginal discharge.     · You have pelvic pain that is getting worse.    Watch closely for changes in your health, and be sure to contact your doctor if:    · You cannot feel the string, or the IUD comes out.     · You feel sick to your stomach, or you vomit.     · You think you may be pregnant. Where can you learn more? Go to http://enrike-april.info/. Enter V507 in the search box to learn more about \"Intrauterine Device (IUD) Insertion: Care Instructions. \"  Current as of: September 5, 2018  Content Version: 11.9  © 4151-1214 [employer].  Care instructions adapted under license by [employer] (which disclaims liability or warranty for this information). If you have questions about a medical condition or this instruction, always ask your healthcare professional. [place] [address] any warranty or liability for your use of this information.

## 2019-05-22 ENCOUNTER — TELEPHONE (OUTPATIENT)
Dept: OBGYN CLINIC | Age: 24
End: 2019-05-22

## 2019-05-22 ENCOUNTER — OFFICE VISIT (OUTPATIENT)
Dept: OBGYN CLINIC | Age: 24
End: 2019-05-22

## 2019-05-22 VITALS
BODY MASS INDEX: 23.29 KG/M2 | SYSTOLIC BLOOD PRESSURE: 104 MMHG | WEIGHT: 136.4 LBS | DIASTOLIC BLOOD PRESSURE: 66 MMHG | HEIGHT: 64 IN

## 2019-05-22 DIAGNOSIS — R30.0 DYSURIA: Primary | ICD-10-CM

## 2019-05-22 LAB
BILIRUB UR QL STRIP: NEGATIVE
GLUCOSE UR-MCNC: NEGATIVE MG/DL
KETONES P FAST UR STRIP-MCNC: NEGATIVE MG/DL
PH UR STRIP: 6.5 [PH] (ref 4.6–8)
PROT UR QL STRIP: NEGATIVE
SP GR UR STRIP: 1.01 (ref 1–1.03)
UA UROBILINOGEN AMB POC: NORMAL (ref 0.2–1)
URINALYSIS CLARITY POC: CLEAR
URINALYSIS COLOR POC: YELLOW
URINE BLOOD POC: NORMAL
URINE LEUKOCYTES POC: NEGATIVE
URINE NITRITES POC: NEGATIVE

## 2019-05-22 NOTE — PROGRESS NOTES
Urinary Complaints    CC: burning with urination    HPI: Ms. Oneal Washington is a 22 y.o.  who presents with a history of urinary pain, urgency, frequency. She has not been evaluated for current complaints. She does not have a history of frequent/recurrent UTIs. Also reports spotting since surgery. Symptoms began 2 days ago. Severity? 3 on a scale of 1-10  Timing: acute. There are no aggravating factors. There are no alleviating factors. Modifying factors: none    Other associated symptoms:   Denies changes in bowel or bladder function. Denies fevers/chills. Denies dyspareunia.     Past Medical History:   Diagnosis Date    Abnormal Pap smear of cervix     Ill-defined condition     DEPRESSION AND ANXIETY    Ill-defined condition     LOWER BACK ISSUES       History reviewed. No pertinent surgical history.     Family History   Problem Relation Age of Onset    No Known Problems Mother     No Known Problems Father     No Known Problems Sister     No Known Problems Sister        Social History     Socioeconomic History    Marital status:      Spouse name: Not on file    Number of children: Not on file    Years of education: Not on file    Highest education level: Not on file   Occupational History    Not on file   Social Needs    Financial resource strain: Not on file    Food insecurity:     Worry: Not on file     Inability: Not on file    Transportation needs:     Medical: Not on file     Non-medical: Not on file   Tobacco Use    Smoking status: Current Every Day Smoker     Packs/day: 0.25     Last attempt to quit: 10/2018     Years since quittin.6    Smokeless tobacco: Never Used   Substance and Sexual Activity    Alcohol use: No     Frequency: Never    Drug use: No    Sexual activity: Yes     Partners: Male     Birth control/protection: IUD   Lifestyle    Physical activity:     Days per week: Not on file     Minutes per session: Not on file    Stress: Not on file   Relationships    Social connections:     Talks on phone: Not on file     Gets together: Not on file     Attends Worship service: Not on file     Active member of club or organization: Not on file     Attends meetings of clubs or organizations: Not on file     Relationship status: Not on file    Intimate partner violence:     Fear of current or ex partner: Not on file     Emotionally abused: Not on file     Physically abused: Not on file     Forced sexual activity: Not on file   Other Topics Concern    Not on file   Social History Narrative    Not on file       Current Outpatient Medications   Medication Sig Dispense Refill    levonorgestrel (MIRENA) 20 mcg/24 hours (5 yrs) 52 mg IUD 1 Device by IntraUTERine route once.          No Known Allergies    Review of Systems - History obtained from the patient  Constitutional: negative for weight loss, fever, night sweats  HEENT: negative for hearing loss, earache, congestion, snoring, sorethroat  CV: negative for chest pain, palpitations, edema  Resp: negative for cough, shortness of breath, wheezing  GI: negative for change in bowel habits, abdominal pain, black or bloody stools  : negative for frequency, dysuria, hematuria, vaginal discharge  MSK: negative for back pain, joint pain, muscle pain  Breast: negative for breast lumps, nipple discharge, galactorrhea  Skin :negative for itching, rash, hives  Neuro: negative for dizziness, headache, confusion, weakness  Psych: negative for anxiety, depression, change in mood  Heme/lymph: negative for bleeding, bruising, pallor      Physical Exam    Visit Vitals  /66 (BP 1 Location: Left arm, BP Patient Position: Sitting)   Ht 5' 4\" (1.626 m)   Wt 136 lb 6.4 oz (61.9 kg)   BMI 23.41 kg/m²       HENT  · Head and Face: appears normal    Neck  · Inspection/Palpation: normal appearance, no masses or tenderness  · Lymph Nodes: no lymphadenopathy present  · Thyroid: gland size normal, nontender, no nodules or masses present on palpation    Chest  · Respiratory Effort: non-labored breathing  · Auscultation: Clear to auscultation bilaterlly, normal breath sounds    Cardiovascular  · Heart:  · Auscultation: regular rate and rhythm without murmur  · Extremities: no peripheral edema    Gastrointestinal  · Abdominal Examination: abdomen non-tender to palpation, normal bowel sounds, no masses present, NO CVAT BILATERALLY   · Liver and spleen: no hepatomegaly present, spleen not palpable  · Hernias: no hernias identified    Skin  · General Inspection: no rash, no lesions identified    Neurologic/Psychiatric  · Mental Status:  · Orientation: grossly oriented to person, place and time  · Mood and Affect: mood normal, affect appropriate    Results for orders placed or performed in visit on 05/22/19   AMB POC URINALYSIS DIP STICK MANUAL W/O MICRO   Result Value Ref Range    Color (UA POC) Yellow     Clarity (UA POC) Clear     Glucose (UA POC) Negative Negative    Bilirubin (UA POC) Negative Negative    Ketones (UA POC) Negative Negative    Specific gravity (UA POC) 1.010 1.001 - 1.035    Blood (UA POC) 2+ Negative    pH (UA POC) 6.5 4.6 - 8.0    Protein (UA POC) Negative Negative    Urobilinogen (UA POC) 0.2 mg/dL 0.2 - 1    Nitrites (UA POC) Negative Negative    Leukocyte esterase (UA POC) Negative Negative         Assessment/Plan:  Dysuria, declines empiric treatment today. Urine culture sent. Will treat pending results. Advised to call if symptoms worsen. RTC for annual or sooner if needed.     Signed By: Chris Montano MD     May 22, 2019

## 2019-05-22 NOTE — TELEPHONE ENCOUNTER
Call received at 12:00NN        22year old patient last seen in the office on 4/29/19 for insertion of IUD. Patient calling to say that since insertion she has been bleeding \"heavy\" changing her pad/tampon 2 times a day. Patient reports cramping at 5 on the pain scale of 1-10. This nurse advised of with IUD insertion she could have bleeding for about 8 weeks. Patient provided with pain and bleeding precautions. Patient reports for the past two days ,symptoms of foul odor and burning upon urination. Patient placed on the schedule to be seen at 1:50PM today. Message sent to [address] [place] [address] regarding appointment added. Patient verbalized understanding.

## 2019-05-22 NOTE — PATIENT INSTRUCTIONS
Painful Urination (Dysuria): Care Instructions Your Care Instructions Burning pain with urination (dysuria) is a common symptom of a urinary tract infection or other urinary problems. The bladder may become inflamed. This can cause pain when the bladder fills and empties. You may also feel pain if the tube that carries urine from the bladder to the outside of the body (urethra) gets irritated or infected. Sexually transmitted infections (STIs) also may cause pain when you urinate. Sometimes the pain can be caused by things other than an infection. The urethra can be irritated by soaps, perfumes, or foreign objects in the urethra. Kidney stones can cause pain when they pass through the urethra. The cause may be hard to find. You may need tests. Treatment for painful urination depends on the cause. Follow-up care is a key part of your treatment and safety. Be sure to make and go to all appointments, and call your doctor if you are having problems. It's also a good idea to know your test results and keep a list of the medicines you take. How can you care for yourself at home? · Drink extra water for the next day or two. This will help make the urine less concentrated. (If you have kidney, heart, or liver disease and have to limit fluids, talk with your doctor before you increase the amount of fluids you drink.) · Avoid drinks that are carbonated or have caffeine. They can irritate the bladder. · Urinate often. Try to empty your bladder each time. For women: · Urinate right after you have sex. · After going to the bathroom, wipe from front to back. · Avoid douches, bubble baths, and feminine hygiene sprays. And avoid other feminine hygiene products that have deodorants. When should you call for help? Call your doctor now or seek immediate medical care if: 
  · You have new symptoms, such as fever, nausea, or vomiting.  
  · You have new or worse symptoms of a urinary problem. For example: ? You have blood or pus in your urine. ? You have chills or body aches. ? It hurts worse to urinate. ? You have groin or belly pain. ? You have pain in your back just below your rib cage (the flank area).  
 Watch closely for changes in your health, and be sure to contact your doctor if you have any problems. Where can you learn more? Go to http://enrike-april.info/. Enter V915 in the search box to learn more about \"Painful Urination (Dysuria): Care Instructions. \" Current as of: March 20, 2018 Content Version: 11.9 © 7324-6584 [employer]. Care instructions adapted under license by [employer] (which disclaims liability or warranty for this information). If you have questions about a medical condition or this instruction, always ask your healthcare professional. [address] any warranty or liability for your use of this information.

## 2019-05-27 LAB — BACTERIA UR CULT: ABNORMAL

## 2019-05-28 RX ORDER — NITROFURANTOIN 25; 75 MG/1; MG/1
100 CAPSULE ORAL 2 TIMES DAILY
Qty: 14 CAP | Refills: 0 | Status: SHIPPED | OUTPATIENT
Start: 2019-05-28 | End: 2020-07-09

## 2019-05-28 NOTE — PROGRESS NOTES
Left message for patient to call back regarding lab results. Please discuss with patient when she calls back.

## 2019-05-28 NOTE — PROGRESS NOTES
Patient aware of results and MD recommendations by phone. Patient aware that rx was sent and she can pick it up today.

## 2019-09-12 ENCOUNTER — OFFICE VISIT (OUTPATIENT)
Dept: OBGYN CLINIC | Age: 24
End: 2019-09-12

## 2019-09-12 VITALS — BODY MASS INDEX: 24.2 KG/M2 | SYSTOLIC BLOOD PRESSURE: 114 MMHG | WEIGHT: 141 LBS | DIASTOLIC BLOOD PRESSURE: 70 MMHG

## 2019-09-12 DIAGNOSIS — R21 VULVAR RASH: Primary | ICD-10-CM

## 2019-09-12 NOTE — PATIENT INSTRUCTIONS

## 2019-09-12 NOTE — PROGRESS NOTES
Vulvar/Vaginal Abnormality    CC: Vulvar/Vaginal Abnormality    HPI: Ms. Alan Sosa is a 22 y.o. female presenting for evaluation of vulvar/vaginal abnormality. Reports new rash and left labial bump. Onset: 10 days  Location of abnormality: vulval  Duration: above  Quality: painful   Context: in setting of using new pads. Past Medical History:   Diagnosis Date    Abnormal Pap smear of cervix     Ill-defined condition     DEPRESSION AND ANXIETY    Ill-defined condition     LOWER BACK ISSUES       History reviewed. No pertinent surgical history.     Family History   Problem Relation Age of Onset    No Known Problems Mother     No Known Problems Father     No Known Problems Sister     No Known Problems Sister        Social History     Socioeconomic History    Marital status:      Spouse name: Not on file    Number of children: Not on file    Years of education: Not on file    Highest education level: Not on file   Occupational History    Not on file   Social Needs    Financial resource strain: Not on file    Food insecurity:     Worry: Not on file     Inability: Not on file    Transportation needs:     Medical: Not on file     Non-medical: Not on file   Tobacco Use    Smoking status: Current Every Day Smoker     Packs/day: 0.25     Last attempt to quit: 10/2018     Years since quittin.9    Smokeless tobacco: Never Used   Substance and Sexual Activity    Alcohol use: No     Frequency: Never    Drug use: No    Sexual activity: Yes     Partners: Male     Birth control/protection: IUD   Lifestyle    Physical activity:     Days per week: Not on file     Minutes per session: Not on file    Stress: Not on file   Relationships    Social connections:     Talks on phone: Not on file     Gets together: Not on file     Attends Latter day service: Not on file     Active member of club or organization: Not on file     Attends meetings of clubs or organizations: Not on file Relationship status: Not on file    Intimate partner violence:     Fear of current or ex partner: Not on file     Emotionally abused: Not on file     Physically abused: Not on file     Forced sexual activity: Not on file   Other Topics Concern    Not on file   Social History Narrative    Not on file       Current Outpatient Medications   Medication Sig Dispense Refill    levonorgestrel (MIRENA) 20 mcg/24 hours (5 yrs) 52 mg IUD 1 Device by IntraUTERine route once.  nitrofurantoin, macrocrystal-monohydrate, (MACROBID) 100 mg capsule Take 1 Cap by mouth two (2) times a day.  14 Cap 0       No Known Allergies    Review of Systems - History obtained from the patient  Constitutional: negative for weight loss, fever, night sweats  HEENT: negative for hearing loss, earache, congestion, snoring, sorethroat  CV: negative for chest pain, palpitations, edema  Resp: negative for cough, shortness of breath, wheezing  GI: negative for change in bowel habits, abdominal pain, black or bloody stools  : negative for frequency, dysuria, hematuria, vaginal discharge  MSK: negative for back pain, joint pain, muscle pain  Breast: negative for breast lumps, nipple discharge, galactorrhea  Skin :negative for itching, rash, hives  Neuro: negative for dizziness, headache, confusion, weakness  Psych: negative for anxiety, depression, change in mood  Heme/lymph: negative for bleeding, bruising, pallor    Physical Exam    Visit Vitals  /70 (BP 1 Location: Left arm, BP Patient Position: Sitting)   Wt 141 lb (64 kg)   LMP 09/03/2019 (Approximate)   BMI 24.20 kg/m²       HENT  · Head and Face: appears normal    Neck  · Inspection/Palpation: normal appearance, no masses or tenderness  · Lymph Nodes: no lymphadenopathy present  · Thyroid: gland size normal, nontender, no nodules or masses present on palpation    Chest  · Respiratory Effort: non-labored breathing  · Auscultation: Clear to auscultation bilaterlly, normal breath sounds    Cardiovascular  · Heart:  · Auscultation: regular rate and rhythm without murmur  · Extremities: no peripheral edema    Gastrointestinal  · Abdominal Examination: abdomen non-tender to palpation, normal bowel sounds, no masses present  · Liver and spleen: no hepatomegaly present, spleen not palpable  · Hernias: no hernias identified    Genitourinary  · External Genitalia: normal appearance for age, no discharge present, no tenderness present, no inflammatory lesions present, no masses present, no atrophy present  · Vagina: normal vaginal vault without central or paravaginal defects, no discharge present, no inflammatory lesions present, no masses present,   · Bladder: non-tender to palpation  · Urethra: appears normal  · Cervix: normal, no cervicitis, no CMT, IUD strings present  · Uterus: normal size, shape and consistency, mobile  · Adnexa: no adnexal tenderness present, no adnexal masses present  · Perineum: perineum within normal limits, no evidence of trauma, no rashes or skin lesions present    Skin  · General Inspection: no rash, no lesions identified    Neurologic/Psychiatric  · Mental Status:  · Orientation: grossly oriented to person, place and time  · Mood and Affect: mood normal, affect appropriate    Results for orders placed or performed in visit on 05/22/19   CULTURE, URINE   Result Value Ref Range    Urine Culture, Routine (A)      Escherichia coli  50,000-100,000 colony forming units per mL         Susceptibility    Escherichia coli -  (no method available)*     Amoxicillin/Clavulanic A S Susceptible ug/mL     Ampicillin ($) S Susceptible ug/mL     Cefepime ($$) S Susceptible ug/mL     Ceftriaxone ($) S Susceptible ug/mL     Cefuroxime ($) S Susceptible ug/mL     Ciprofloxacin ($) S Susceptible ug/mL     Ertapenem ($$$$) S Susceptible ug/mL     Gentamicin ($) S Susceptible ug/mL     Imipenem S Susceptible ug/mL     Levofloxacin ($) S Susceptible ug/mL     Meropenem ($$) S Susceptible ug/mL Nitrofurantoin S Susceptible ug/mL     Piperacillin/Tazobac ($) S Susceptible ug/mL     Tetracycline S Susceptible ug/mL     Tobramycin ($) S Susceptible ug/mL     Trimeth-Sulfamethoxa S Susceptible ug/mL     * Performed at:  [address]  [id number] Director: Ruma Uriarte MD, Phone:  [phone]   AMB POC URINALYSIS DIP STICK MANUAL W/O MICRO   Result Value Ref Range    Color (UA POC) Yellow     Clarity (UA POC) Clear     Glucose (UA POC) Negative Negative    Bilirubin (UA POC) Negative Negative    Ketones (UA POC) Negative Negative    Specific gravity (UA POC) 1.010 1.001 - 1.035    Blood (UA POC) 2+ Negative    pH (UA POC) 6.5 4.6 - 8.0    Protein (UA POC) Negative Negative    Urobilinogen (UA POC) 0.2 mg/dL 0.2 - 1    Nitrites (UA POC) Negative Negative    Leukocyte esterase (UA POC) Negative Negative       No results found. Assessment/Plan:  Vulvar/vaginal abnormality. Reviewed suspected inflammation from new pads. Advised to stop using them now. Discussed no \"bump on exam,\" she is reassured. Vaginal/vulvar hygiene reviewed.     RTC: in , or sooner prn for problems or concerns  -handouts and instructions provided    Mari Rondon MD  9/12/2019  3:40 PM

## 2019-09-23 ENCOUNTER — OFFICE VISIT (OUTPATIENT)
Dept: OBGYN CLINIC | Age: 24
End: 2019-09-23

## 2019-09-23 VITALS — WEIGHT: 137 LBS | BODY MASS INDEX: 23.52 KG/M2

## 2019-09-23 DIAGNOSIS — N89.8 VAGINAL DISCHARGE: ICD-10-CM

## 2019-09-23 DIAGNOSIS — N87.1 DYSPLASIA OF CERVIX, HIGH GRADE CIN 2: Primary | ICD-10-CM

## 2019-09-23 RX ORDER — FLUCONAZOLE 150 MG/1
150 TABLET ORAL DAILY
Qty: 1 TAB | Refills: 0 | Status: SHIPPED | OUTPATIENT
Start: 2019-09-23 | End: 2019-09-24

## 2019-09-23 NOTE — PROGRESS NOTES
Vaginitis  CC: Vaginal discharge    HPI: Ms. Garland Panda is a 22 y.o. [de-identified]  female presenting for evaluation of vaginal discharge. She has no additional complaints. She has not had previous treatment for this problem. 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     Family History   Problem Relation Age of Onset    No Known Problems Mother     No Known Problems Father     No Known Problems Sister     No Known Problems Sister        Social History     Socioeconomic History    Marital status:      Spouse name: Not on file    Number of children: Not on file    Years of education: Not on file    Highest education level: Not on file   Occupational History    Not on file   Social Needs    Financial resource strain: Not on file    Food insecurity:     Worry: Not on file     Inability: Not on file    Transportation needs:     Medical: Not on file     Non-medical: Not on file   Tobacco Use    Smoking status: Current Every Day Smoker     Packs/day: 0.25     Last attempt to quit: 10/2018     Years since quittin.9    Smokeless tobacco: Never Used   Substance and Sexual Activity    Alcohol use: No     Frequency: Never    Drug use: No    Sexual activity: Yes     Partners: Male     Birth control/protection: IUD   Lifestyle    Physical activity:     Days per week: Not on file     Minutes per session: Not on file    Stress: Not on file   Relationships    Social connections:     Talks on phone: Not on file     Gets together: Not on file     Attends Protestant service: Not on file     Active member of club or organization: Not on file     Attends meetings of clubs or organizations: Not on file     Relationship status: Not on file    Intimate partner violence:     Fear of current or ex partner: Not on file     Emotionally abused: Not on file     Physically abused: Not on file     Forced sexual activity: Not on file   Other Topics Concern    Not on file   Social History Narrative    Not on file       Current Outpatient Medications   Medication Sig Dispense Refill    levonorgestrel (MIRENA) 20 mcg/24 hours (5 yrs) 52 mg IUD 1 Device by IntraUTERine route once.  nitrofurantoin, macrocrystal-monohydrate, (MACROBID) 100 mg capsule Take 1 Cap by mouth two (2) times a day.  14 Cap 0       No Known Allergies    Review of Systems - History obtained from the patient  Constitutional: negative for weight loss, fever, night sweats  HEENT: negative for hearing loss, earache, congestion, snoring, sorethroat  CV: negative for chest pain, palpitations, edema  Resp: negative for cough, shortness of breath, wheezing  GI: negative for change in bowel habits, abdominal pain, black or bloody stools  : negative for frequency, dysuria, hematuria, + vaginal discharge as HPI  MSK: negative for back pain, joint pain, muscle pain  Skin :negative for itching, rash, hives  Neuro: negative for dizziness, headache, confusion, weakness  Psych: negative for anxiety, depression, change in mood  Heme/lymph: negative for bleeding, bruising, pallor    Physical Exam    Visit Vitals  Wt 137 lb (62.1 kg)   LMP 09/03/2019 (Approximate)   BMI 23.52 kg/m²       HENT  · Head and Face: appears normal    Neck  · Inspection/Palpation: normal appearance, no masses or tenderness  · Lymph Nodes: no lymphadenopathy present  · Thyroid: gland size normal, nontender, no nodules or masses present on palpation    Chest  · Respiratory Effort: non-labored breathing  · Auscultation: Clear to auscultation bilaterlly, normal breath sounds    Cardiovascular  · Heart:  · Auscultation: regular rate and rhythm without murmur  · Extremities: no peripheral edema    Gastrointestinal  · Abdominal Examination: abdomen non-tender to palpation, normal bowel sounds, no masses present  · Liver and spleen: no hepatomegaly present, spleen not palpable  · Hernias: no hernias identified    Genitourinary  · External Genitalia: normal appearance for age, no discharge present, no tenderness present, no inflammatory lesions present, no masses present, no atrophy present  · Vagina: normal vaginal vault without central or paravaginal defects, no inflammatory lesions present, no masses present, thick discharge present  · Bladder: non-tender to palpation  · Urethra: appears normal  · Cervix: normal, no cervicitis, no CMT, IUD strings present  · Uterus: normal size, shape and consistency, mobile  · Adnexa: no adnexal tenderness present, no adnexal masses present  · Perineum: perineum within normal limits, no evidence of trauma, no rashes or skin lesions present    Skin  · General Inspection: no rash, no lesions identified    Neurologic/Psychiatric  · Mental Status:  · Orientation: grossly oriented to person, place and time  · Mood and Affect: mood normal, affect appropriate    Results for orders placed or performed in visit on 05/22/19   CULTURE, URINE   Result Value Ref Range    Urine Culture, Routine (A)      Escherichia coli  50,000-100,000 colony forming units per mL         Susceptibility    Escherichia coli -  (no method available)*     Amoxicillin/Clavulanic A S Susceptible ug/mL     Ampicillin ($) S Susceptible ug/mL     Cefepime ($$) S Susceptible ug/mL     Ceftriaxone ($) S Susceptible ug/mL     Cefuroxime ($) S Susceptible ug/mL     Ciprofloxacin ($) S Susceptible ug/mL     Ertapenem ($$$$) S Susceptible ug/mL     Gentamicin ($) S Susceptible ug/mL     Imipenem S Susceptible ug/mL Levofloxacin ($) S Susceptible ug/mL     Meropenem ($$) S Susceptible ug/mL     Nitrofurantoin S Susceptible ug/mL     Piperacillin/Tazobac ($) S Susceptible ug/mL     Tetracycline S Susceptible ug/mL     Tobramycin ($) S Susceptible ug/mL     Trimeth-Sulfamethoxa S Susceptible ug/mL     * Performed at:  [address]  [id number] Director: Kelvin Aguilera MD, Phone:  [phone]   AMB POC URINALYSIS DIP STICK MANUAL W/O MICRO   Result Value Ref Range    Color (UA POC) Yellow     Clarity (UA POC) Clear     Glucose (UA POC) Negative Negative    Bilirubin (UA POC) Negative Negative    Ketones (UA POC) Negative Negative    Specific gravity (UA POC) 1.010 1.001 - 1.035    Blood (UA POC) 2+ Negative    pH (UA POC) 6.5 4.6 - 8.0    Protein (UA POC) Negative Negative    Urobilinogen (UA POC) 0.2 mg/dL 0.2 - 1    Nitrites (UA POC) Negative Negative    Leukocyte esterase (UA POC) Negative Negative           Assessment/Plan:  Yeast Vaginitis   Nuswab sent  Rx for diflucan sent. Pap sent    Counseled on safe sexual practices (barrier contraception). Reviewed vulvar/vaginal hygiene and irritant avoidance. RTC: for annual , or sooner prn for problems or concerns. Handouts and instructions provided.     Derek Mckeon MD  9/23/2019  2:27 PM

## 2019-09-23 NOTE — PATIENT INSTRUCTIONS

## 2019-09-26 LAB
CYTOLOGIST CVX/VAG CYTO: NORMAL
CYTOLOGY CVX/VAG DOC CYTO: NORMAL
CYTOLOGY CVX/VAG DOC THIN PREP: NORMAL
DX ICD CODE: NORMAL
LABCORP, 190119: NORMAL
Lab: NORMAL
Lab: NORMAL
OTHER STN SPEC: NORMAL
STAT OF ADQ CVX/VAG CYTO-IMP: NORMAL

## 2019-09-27 LAB
A VAGINAE DNA VAG QL NAA+PROBE: ABNORMAL SCORE
BVAB2 DNA VAG QL NAA+PROBE: ABNORMAL SCORE
C ALBICANS DNA VAG QL NAA+PROBE: POSITIVE
C GLABRATA DNA VAG QL NAA+PROBE: NEGATIVE
C TRACH RRNA SPEC QL NAA+PROBE: NEGATIVE
MEGA1 DNA VAG QL NAA+PROBE: ABNORMAL SCORE
N GONORRHOEA RRNA SPEC QL NAA+PROBE: NEGATIVE
T VAGINALIS RRNA SPEC QL NAA+PROBE: NEGATIVE

## 2019-10-02 ENCOUNTER — TELEPHONE (OUTPATIENT)
Dept: OBGYN CLINIC | Age: 24
End: 2019-10-02

## 2019-12-26 ENCOUNTER — OFFICE VISIT (OUTPATIENT)
Dept: OBGYN CLINIC | Age: 24
End: 2019-12-26

## 2019-12-26 VITALS
WEIGHT: 133 LBS | SYSTOLIC BLOOD PRESSURE: 106 MMHG | DIASTOLIC BLOOD PRESSURE: 64 MMHG | BODY MASS INDEX: 22.71 KG/M2 | HEIGHT: 64 IN

## 2019-12-26 DIAGNOSIS — B37.31 VAGINAL CANDIDIASIS: Primary | ICD-10-CM

## 2019-12-26 RX ORDER — NYSTATIN AND TRIAMCINOLONE ACETONIDE 100000; 1 [USP'U]/G; MG/G
OINTMENT TOPICAL 2 TIMES DAILY
Qty: 30 G | Refills: 0 | Status: SHIPPED | OUTPATIENT
Start: 2019-12-26 | End: 2020-07-09

## 2019-12-26 RX ORDER — TERCONAZOLE 8 MG/G
CREAM VAGINAL
Qty: 50 G | Refills: 1 | Status: SHIPPED | OUTPATIENT
Start: 2019-12-26 | End: 2020-07-09

## 2019-12-26 RX ORDER — TERCONAZOLE 4 MG/G
1 CREAM VAGINAL
Qty: 1 TUBE | Refills: 0 | Status: SHIPPED | OUTPATIENT
Start: 2019-12-26 | End: 2020-01-02

## 2019-12-26 NOTE — PROGRESS NOTES
Problem Visit    Guillaume Spivey is a 22 y.o.  presenting for problem visit. Her main concern today is: green discharge for 2 weeks. Reports severe itching, burning, and odor. She reports she has chronic and recurrent yeast infections, started over the summer. Her partner is also getting examined. Reports her symptoms will recur soon after finishing her treatment. Ob/Gyn Hx:      LMP- unsure  Menses-  mirena  Contraception- Mirena  SA- yes      Past Medical History:   Diagnosis Date    Abnormal Pap smear of cervix     Ill-defined condition     DEPRESSION AND ANXIETY    Ill-defined condition     LOWER BACK ISSUES       History reviewed. No pertinent surgical history. Family History   Problem Relation Age of Onset    No Known Problems Mother     No Known Problems Father     No Known Problems Sister     No Known Problems Sister        Social History     Socioeconomic History    Marital status:      Spouse name: Not on file    Number of children: Not on file    Years of education: Not on file    Highest education level: Not on file   Occupational History    Not on file   Social Needs    Financial resource strain: Not on file    Food insecurity:     Worry: Not on file     Inability: Not on file    Transportation needs:     Medical: Not on file     Non-medical: Not on file   Tobacco Use    Smoking status: Current Every Day Smoker     Packs/day: 0.25     Last attempt to quit: 10/2018     Years since quittin.2    Smokeless tobacco: Never Used   Substance and Sexual Activity    Alcohol use: No     Frequency: Never    Drug use: No    Sexual activity: Yes     Partners: Male     Birth control/protection: I.U.D.    Lifestyle    Physical activity:     Days per week: Not on file     Minutes per session: Not on file    Stress: Not on file   Relationships    Social connections:     Talks on phone: Not on file     Gets together: Not on file     Attends Jainism service: Not on file     Active member of club or organization: Not on file     Attends meetings of clubs or organizations: Not on file     Relationship status: Not on file    Intimate partner violence:     Fear of current or ex partner: Not on file     Emotionally abused: Not on file     Physically abused: Not on file     Forced sexual activity: Not on file   Other Topics Concern    Not on file   Social History Narrative    Not on file       Current Outpatient Medications   Medication Sig Dispense Refill    nystatin-triamcinolone (MYCOLOG) 100,000-0.1 unit/gram-% ointment Apply  to affected area two (2) times a day. 30 g 0    terconazole (TERAZOL 7) 0.4 % vaginal cream Insert 1 Applicator into vagina nightly for 7 days. 1 Tube 0    terconazole (TERAZOL 3) 0.8 % vaginal cream Insert 1 applicator into the vagina at night before bed twice weekly for 12 weeks. 50 g 1    levonorgestrel (MIRENA) 20 mcg/24 hours (5 yrs) 52 mg IUD 1 Device by IntraUTERine route once.  nitrofurantoin, macrocrystal-monohydrate, (MACROBID) 100 mg capsule Take 1 Cap by mouth two (2) times a day.  14 Cap 0       No Known Allergies    Review of Systems - History obtained from the patient  Constitutional: negative for weight loss, fever, night sweats  HEENT: negative for hearing loss, earache, congestion, snoring, sorethroat  CV: negative for chest pain, palpitations, edema  Resp: negative for cough, shortness of breath, wheezing  GI: negative for change in bowel habits, abdominal pain, black or bloody stools  : negative for frequency, dysuria, hematuria, vaginal discharge  MSK: negative for back pain, joint pain, muscle pain  Breast: negative for breast lumps, nipple discharge, galactorrhea  Skin :negative for itching, rash, hives  Neuro: negative for dizziness, headache, confusion, weakness  Psych: negative for anxiety, depression, change in mood  Heme/lymph: negative for bleeding, bruising, pallor    Physical Exam    Visit Vitals  /64   Ht 5' 4\" (1.626 m)   Wt 133 lb (60.3 kg)   LMP  (LMP Unknown)   BMI 22.83 kg/m²         OBGyn Exam      Constitutional  · Appearance: well-nourished, well developed, alert, in no acute distress    HENT  · Head and Face: appears normal    Neck  · Inspection/Palpation: normal appearance, no masses or tenderness  · Thyroid: gland size normal, nontender    Chest  · Respiratory Effort: non-labored breathing    Cardiovascular  · Extremities: no peripheral edema    Gastrointestinal  · Abdominal Examination: abdomen non-distended, non-tender to palpation, no masses present  · Liver and spleen: no hepatomegaly present, spleen not palpable  · Hernias: no hernias identified    Genitourinary  · External Genitalia: normal appearance for age, no discharge present, tenderness present, no inflammatory lesions present, no masses present, no atrophy present, b/l vulvar erythema present and few excoriations  · Vagina: normal vaginal vault without central or paravaginal defects, thick white-green discharge present, no inflammatory lesions present, no masses present  · Bladder: non-tender to palpation  · Urethra: appears normal  · Cervix: normal       Skin  · General Inspection: no rash, no lesions identified    Neurologic/Psychiatric  · Mental Status:  · Orientation: grossly oriented to person, place and time  · Mood and Affect: mood normal, affect appropriate      Assessment/Plan:    1. Vaginal candidiasis  Discussed her exam findings and her sx of severe recurrent VVC. Will treat topically with mycoog ointment and then terconazole cream and then do 12 week suppressive course with terazol. - nystatin-triamcinolone (MYCOLOG) 100,000-0.1 unit/gram-% ointment; Apply  to affected area two (2) times a day. Dispense: 30 g; Refill: 0  - terconazole (TERAZOL 7) 0.4 % vaginal cream; Insert 1 Applicator into vagina nightly for 7 days.   Dispense: 1 Tube; Refill: 0  - terconazole (TERAZOL 3) 0.8 % vaginal cream; Insert 1 applicator into the vagina at night before bed twice weekly for 12 weeks.   Dispense: 50 g; Refill: 1      Katina Hodges MD

## 2019-12-26 NOTE — PATIENT INSTRUCTIONS
Bacterial Vaginosis: Care Instructions  Your Care Instructions    Bacterial vaginosis is a type of vaginal infection. It is caused by excess growth of certain bacteria that are normally found in the vagina. Symptoms can include itching, swelling, pain when you urinate or have sex, and a gray or yellow discharge with a \"fishy\" odor. It is not considered an infection that is spread through sexual contact. Although symptoms can be annoying and uncomfortable, bacterial vaginosis does not usually cause other health problems. However, if you have it while you are pregnant, it can cause complications. While the infection may go away on its own, most doctors use antibiotics to treat it. You may have been prescribed pills or vaginal cream. With treatment, bacterial vaginosis usually clears up in 5 to 7 days. Follow-up care is a key part of your treatment and safety. Be sure to make and go to all appointments, and call your doctor if you are having problems. It's also a good idea to know your test results and keep a list of the medicines you take. How can you care for yourself at home? · Take your antibiotics as directed. Do not stop taking them just because you feel better. You need to take the full course of antibiotics. · Do not eat or drink anything that contains alcohol if you are taking metronidazole (Flagyl). · Keep using your medicine if you start your period. Use pads instead of tampons while using a vaginal cream or suppository. Tampons can absorb the medicine. · Wear loose cotton clothing. Do not wear nylon and other materials that hold body heat and moisture close to the skin. · Do not scratch. Relieve itching with a cold pack or a cool bath. · Do not wash your vaginal area more than once a day. Use plain water or a mild, unscented soap. Do not douche. When should you call for help?   Watch closely for changes in your health, and be sure to contact your doctor if:    · You have unexpected vaginal bleeding.     · You have a fever.     · You have new or increased pain in your vagina or pelvis.     · You are not getting better after 1 week.     · Your symptoms return after you finish the course of your medicine. Where can you learn more? Go to http://enrike-april.info/. Ele Aguayo in the search box to learn more about \"Bacterial Vaginosis: Care Instructions. \"  Current as of: February 19, 2019  Content Version: 12.2  © 3207-7506 [employer]. Care instructions adapted under license by [employer] (which disclaims liability or warranty for this information). If you have questions about a medical condition or this instruction, always ask your healthcare professional. [address] any warranty or liability for your use of this information.

## 2020-02-20 ENCOUNTER — TELEPHONE (OUTPATIENT)
Dept: OBGYN CLINIC | Age: 25
End: 2020-02-20

## 2020-02-20 NOTE — TELEPHONE ENCOUNTER
Patient of PRASANTH, calling back for refill on her Teraconazole vaginal cream.  She said that RL had told her she needed to use this for a 12 week stretch of time back at her last visit (12/26/19). She said she will need this with additional refills to get her through. What would you request that we refill for her if approved? I see two different strengths. ..         Adina Mehta 14

## 2020-02-20 NOTE — TELEPHONE ENCOUNTER
I attempted to call the [facility] at [facility] where the patient said she got her rx filled. She said she has received 3 boxes of the cream with 3 applicators in each. She said she is stretching the cream to 5 applications. The [facility] said that they had never filled the prescription, only Ibuprofen and cough med. Obviously, the patient is looking at the label and knows that is where she got it. Im confused. Should I just go ahead and send the 0.4% Terazol with 24 applicators to be used twice per week for 12 week period to make sure she has enough. Im confused after speaking with patient and pharmacy!

## 2020-02-21 RX ORDER — TERCONAZOLE 4 MG/G
CREAM VAGINAL
Qty: 24 TUBE | Refills: 0 | Status: SHIPPED | OUTPATIENT
Start: 2020-02-21 | End: 2020-07-09

## 2020-02-21 RX ORDER — TERCONAZOLE 4 MG/G
1 CREAM VAGINAL
Qty: 45 G | Refills: 0 | Status: CANCELLED | OUTPATIENT
Start: 2020-02-21

## 2020-02-21 NOTE — TELEPHONE ENCOUNTER
Helen, MD Claudine Burgess, LPN   Caller: Unspecified (Yesterday,  1:07 PM)             That is weird. Yes, let just send a new rx for terazol as outlined before with 24 applicators to cover her 3 month course - can you send in tomorrow?     Previous Messages

## 2020-02-21 NOTE — TELEPHONE ENCOUNTER
Helen, MD Marija Hoyt, BRIAN   Caller: Unspecified (Yesterday,  1:07 PM)             That is weird. Yes, let just send a new rx for terazol as outlined before with 24 applicators to cover her 3 month course - can you send in tomorrow?     Previous Messages

## 2020-02-21 NOTE — TELEPHONE ENCOUNTER
02/21/20- I called and spoke with pharmacist.  He said he could clearly see where she had filled the Terazol in the past.  I went ahead and verbally gave orders for Terazol 3.1%- 24 applicators to use twice weekly x 12 weeks under [facility].

## 2020-07-09 ENCOUNTER — OFFICE VISIT (OUTPATIENT)
Dept: OBGYN CLINIC | Age: 25
End: 2020-07-09

## 2020-07-09 VITALS
SYSTOLIC BLOOD PRESSURE: 112 MMHG | WEIGHT: 131.25 LBS | BODY MASS INDEX: 22.41 KG/M2 | DIASTOLIC BLOOD PRESSURE: 70 MMHG | HEIGHT: 64 IN

## 2020-07-09 DIAGNOSIS — N64.52 NIPPLE DISCHARGE IN FEMALE: Primary | ICD-10-CM

## 2020-07-09 NOTE — PROGRESS NOTES
Breast Evaluation    Marry Huynh is a [de-identified] ,  26 y.o. female PATIENT REFUSED No LMP recorded. (Menstrual status: IUD). .    She presents with breast/nipple discharge located in both breast. Also reports minimal breast discomfort bilaterally    Onset: 6 months ago  Location: nipple  Quality: light  Severity: bothersome  Timing: above    Fevers? No  Chills? No    Past Medical History:   Diagnosis Date    Abnormal Pap smear of cervix     Ill-defined condition     DEPRESSION AND ANXIETY    Ill-defined condition     LOWER BACK ISSUES     No past surgical history on file. Social History     Occupational History    Not on file   Tobacco Use    Smoking status: Current Every Day Smoker     Packs/day: 0.25     Last attempt to quit: 10/2018     Years since quittin.7    Smokeless tobacco: Never Used   Substance and Sexual Activity    Alcohol use: No     Frequency: Never    Drug use: No    Sexual activity: Yes     Partners: Male     Birth control/protection: I.U.D. Family History   Problem Relation Age of Onset    No Known Problems Mother     No Known Problems Father     No Known Problems Sister     No Known Problems Sister        No Known Allergies  Prior to Admission medications    Medication Sig Start Date End Date Taking? Authorizing Provider   levonorgestrel (MIRENA) 20 mcg/24 hours (5 yrs) 52 mg IUD 1 Device by IntraUTERine route once. Yes Provider, Historical   terconazole (TERAZOL 7) 0.4 % vaginal cream Dispense 24 applicators- use twice weekly x 12 weeks 20   Trini Cervantes MD   nystatin-triamcinolone [facility]) 100,000-0.1 unit/gram-% ointment Apply  to affected area two (2) times a day. 19   Trini Cervantes MD   terconazole (TERAZOL 3) 0.8 % vaginal cream Insert 1 applicator into the vagina at night before bed twice weekly for 12 weeks. 19   Trini Cervantes MD   nitrofurantoin, macrocrystal-monohydrate, (MACROBID) 100 mg capsule Take 1 Cap by mouth two (2) times a day. 5/28/19   Flora Richardson MD        Review of Systems: History obtained from the patient  Constitutional: negative for weight loss, fever, night sweats  HEENT: negative for hearing loss, earache, congestion, snoring, sorethroat  CV: negative for chest pain, palpitations, edema  Resp: negative for cough, shortness of breath, wheezing  Breast: see above  GI: negative for change in bowel habits, abdominal pain, black or bloody stools  : negative for frequency, dysuria, hematuria, vaginal discharge  MSK: negative for back pain, joint pain, muscle pain  Skin: negative for itching, rash, hives  Neuro: negative for dizziness, headache, confusion, weakness  Psych: negative for anxiety, depression, change in mood  Heme/lymph: negative for bleeding, bruising, pallor    Objective:  Visit Vitals  /70 (BP 1 Location: Left arm, BP Patient Position: Sitting)   Ht 5' 4\" (1.626 m)   Wt 131 lb 4 oz (59.5 kg)   BMI 22.53 kg/m²         Constitutional  · Appearance: well-nourished, well developed, alert, in no acute distress    HENT  · Head and Face: appears normal    Neck  · Inspection/Palpation: normal appearance, no masses or tenderness  · Lymph Nodes: no lymphadenopathy present  · Thyroid: gland size normal, nontender, no nodules or masses present on palpation    Chest  · Respiratory Effort: non-labored breathing  · Auscultation: CTAB, normal breath sounds    Cardiovascular  · Heart:  · Auscultation: regular rate and rhythm without murmur  · Extremities: no peripheral edema    Breasts  · Inspection of Breasts: breasts symmetrical, no skin changes, no discharge present, nipple appearance normal, no skin retraction present  · Palpation of Breasts and Axillae: no masses present on palpation, no breast tenderness  · Axillary Lymph Nodes: no lymphadenopathy present    Gastrointestinal  · Abdominal Examination: abdomen non-tender to palpation, normal bowel sounds, no masses present  · Liver and spleen: no hepatomegaly present, spleen not palpable  · Hernias: no hernias identified    Skin  · General Inspection: no rash, no lesions identified    Neurologic/Psychiatric  · Mental Status:  · Orientation: grossly oriented to person, place and time  · Mood and Affect: mood normal, affect appropriate        Assessment:  Bilateral green breast discharge    Plan:  Ref to breast surgery    Signed By: Shirley Madrigal MD     July 9, 2020

## 2020-07-09 NOTE — PATIENT INSTRUCTIONS

## 2020-07-14 ENCOUNTER — TELEPHONE (OUTPATIENT)
Dept: OBGYN CLINIC | Age: 25
End: 2020-07-14

## 2020-07-14 DIAGNOSIS — N64.52 BILATERAL NIPPLE DISCHARGE: Primary | ICD-10-CM

## 2020-07-14 NOTE — TELEPHONE ENCOUNTER
Patient was seen on 7/9/20 for bilateral green nipple discharge, she has been waiting for a referral to be placed for her. She said she has heard nothing. Sending to on call physician. .. SP      Notes show that she needs to be referred to a breast surgeon. Since Roberta Keith is out, do you want her to use anyone in particular? [facility]? Can you help me?

## 2020-07-14 NOTE — TELEPHONE ENCOUNTER
Referral has been placed for [facility] and patient is aware. She was given [phone] to call to schedule.

## 2020-07-15 NOTE — TELEPHONE ENCOUNTER
Message left at 1:27PM     26year old patient last seen in the office on 7/9/2020    Patient left a message that she is having trouble getting the referral.    This nurse called the patient back and then called [facility]    Patient advised due to her PCP closed to call her insurance to get the referral approved so she can see the specialist  Patient verbalized understanding.

## 2020-07-15 NOTE — TELEPHONE ENCOUNTER
Message left at 2:29PM    Patient left another message regarding the referral and states she contacted the insurance company and they said the referral needs to come from us    This nurse contacted the prior authorization person TS and was told the the authorization needs to come from her PCP. It was suggested to the patient to use [facility] as PCP. Patient verbalized understanding.

## 2020-08-06 ENCOUNTER — OFFICE VISIT (OUTPATIENT)
Dept: SURGERY | Age: 25
End: 2020-08-06

## 2020-08-06 VITALS — WEIGHT: 131 LBS | BODY MASS INDEX: 22.36 KG/M2 | HEIGHT: 64 IN

## 2020-08-06 DIAGNOSIS — N63.0 BREAST MASS: Primary | ICD-10-CM

## 2020-08-06 NOTE — PATIENT INSTRUCTIONS

## 2021-03-01 PROBLEM — G89.29 CHRONIC LOWER BACK PAIN: Status: ACTIVE | Noted: 2017-01-01

## 2021-03-01 PROBLEM — R87.619 ABNORMAL PAP SMEAR OF CERVIX: Status: ACTIVE | Noted: 2019-01-01

## 2021-03-01 PROBLEM — M54.50 CHRONIC LOWER BACK PAIN: Status: ACTIVE | Noted: 2017-01-01

## 2021-03-11 ENCOUNTER — OFFICE VISIT (OUTPATIENT)
Dept: OBGYN CLINIC | Age: 26
End: 2021-03-11
Payer: OTHER GOVERNMENT

## 2021-03-11 VITALS
DIASTOLIC BLOOD PRESSURE: 62 MMHG | SYSTOLIC BLOOD PRESSURE: 104 MMHG | HEIGHT: 65 IN | WEIGHT: 134.5 LBS | BODY MASS INDEX: 22.41 KG/M2

## 2021-03-11 DIAGNOSIS — Z11.3 SCREENING EXAMINATION FOR STD (SEXUALLY TRANSMITTED DISEASE): ICD-10-CM

## 2021-03-11 DIAGNOSIS — Z01.419 ENCOUNTER FOR GYNECOLOGICAL EXAMINATION WITHOUT ABNORMAL FINDING: Primary | ICD-10-CM

## 2021-03-11 PROCEDURE — 99395 PREV VISIT EST AGE 18-39: CPT | Performed by: OBSTETRICS & GYNECOLOGY

## 2021-03-11 NOTE — PROGRESS NOTES
Annual exam    Ede Erickson is a 26 y.o. presenting for annual exam.   Her main concerns today include none. Same sex partners now    Ob/Gyn Hx:    No LMP recorded. (Menstrual status: IUD). Menses- regular monthly cycles? no, Bothersome? no  Contraception-IUD  STI- wants testing today- swab only. SA-yes    Health maintenance:  Pap-2019 Normal  Mammo-N/A  Colonoscopy- N/A  General Headings- per patient. Past Medical History:   Diagnosis Date    Abnormal Pap smear of cervix     colpo & leep    Anxiety and depression 2010    seen psychiatrist, was given meds     Chronic lower back pain 2017    multiple falls while skateboarding       Past Surgical History:   Procedure Laterality Date    HX LEEP PROCEDURE         Family History   Problem Relation Age of Onset    Schizophrenia Mother     Bipolar Disorder Mother     No Known Problems Father     No Known Problems Sister     No Known Problems Sister     Diabetes Paternal Grandfather        Social History     Socioeconomic History    Marital status: UNKNOWN     Spouse name: Not on file    Number of children: Not on file    Years of education: Not on file    Highest education level: Not on file   Occupational History    Occupation: laser tech, piercing; [employer]   Social Needs    Financial resource strain: Not on file    Food insecurity     Worry: Not on file     Inability: Not on file    Transportation needs     Medical: Not on file     Non-medical: Not on file   Tobacco Use    Smoking status: Current Every Day Smoker     Packs/day: 0.25     Last attempt to quit: 10/2018     Years since quittin.4    Smokeless tobacco: Never Used   Substance and Sexual Activity    Alcohol use:  Yes     Alcohol/week: 2.0 standard drinks     Types: 2 Cans of beer per week     Frequency: Never    Drug use: Yes     Frequency: 7.0 times per week     Types: Marijuana    Sexual activity: Yes     Partners: Male, Female     Birth control/protection: I.U.D. Comment: Mirena 2018   Lifestyle    Physical activity     Days per week: Not on file     Minutes per session: Not on file    Stress: Not on file   Relationships    Social connections     Talks on phone: Not on file     Gets together: Not on file     Attends Advent service: Not on file     Active member of club or organization: Not on file     Attends meetings of clubs or organizations: Not on file     Relationship status: Not on file    Intimate partner violence     Fear of current or ex partner: Not on file     Emotionally abused: Not on file     Physically abused: Not on file     Forced sexual activity: Not on file   Other Topics Concern    Not on file   Social History Narrative    Not on file       Current Outpatient Medications   Medication Sig Dispense Refill    levonorgestrel (MIRENA) 20 mcg/24 hours (5 yrs) 52 mg IUD 1 Device by IntraUTERine route once.          No Known Allergies      Physical Exam    Visit Vitals  /62 (BP 1 Location: Left upper arm, BP Patient Position: Sitting)   Ht 5' 5\" (1.651 m)   Wt 134 lb 8 oz (61 kg)   BMI 22.38 kg/m²       Constitutional  · Appearance: well-nourished, well developed, alert, in no acute distress    HENT  · Head and Face: appears normal    Neck  · Inspection/Palpation: normal appearance, no masses or tenderness  · Lymph Nodes: no lymphadenopathy present  · Thyroid: gland size normal, nontender, no nodules or masses present on palpation    Chest  · Respiratory Effort: non-labored breathing  · Auscultation: CTAB, normal breath sounds    Cardiovascular  · Heart:  · Auscultation: regular rate and rhythm without murmur  · Extremities: no peripheral edema    Breasts  · Inspection of Breasts: breasts symmetrical, no skin changes, no discharge present, nipple appearance normal, no skin retraction present  · Palpation of Breasts and Axillae: no masses present on palpation, no breast tenderness  · Axillary Lymph Nodes: no lymphadenopathy present    Gastrointestinal  · Abdominal Examination: abdomen non-tender to palpation, normal bowel sounds, no masses present  · Liver and spleen: no hepatomegaly present, spleen not palpable  · Hernias: no hernias identified    Genitourinary  · External Genitalia: normal appearance for age, no discharge present, no tenderness present, no inflammatory lesions present, no masses present, no atrophy present  · Vagina: normal vaginal vault without central or paravaginal defects, no discharge present, no inflammatory lesions present, no masses present  · Bladder: non-tender to palpation  · Urethra: appears normal  · Cervix: normal, IUD strings seen  · Perineum: perineum within normal limits, no evidence of trauma, no rashes or skin lesions present    Skin  · General Inspection: no rash, no lesions identified    Neurologic/Psychiatric  · Mental Status:  · Orientation: grossly oriented to person, place and time  · Mood and Affect: mood normal, affect appropriate      Assessment/Plan:  26 y.o. overall doing well. Health Maintenance:  -counseled re: diet, exercise, healthy lifestyle  -pap/HPV- UTD  -STI testing SENT with swab only.   -Gardasil- completed series    RTC: 1 year for annual wellness assessment, or sooner prn for problems or concerns  -handouts and instructions provided    Dara Garner  3/11/2021  9:04 AM

## 2021-03-15 LAB
C TRACH RRNA CVX QL NAA+PROBE: NEGATIVE
CYTOLOGIST CVX/VAG CYTO: NORMAL
CYTOLOGY CVX/VAG DOC CYTO: NORMAL
CYTOLOGY CVX/VAG DOC THIN PREP: NORMAL
DX ICD CODE: NORMAL
LABCORP, 190119: NORMAL
Lab: NORMAL
N GONORRHOEA RRNA CVX QL NAA+PROBE: NEGATIVE
OTHER STN SPEC: NORMAL
STAT OF ADQ CVX/VAG CYTO-IMP: NORMAL
T VAGINALIS RRNA SPEC QL NAA+PROBE: NEGATIVE

## 2021-04-13 ENCOUNTER — TELEPHONE (OUTPATIENT)
Dept: OBGYN CLINIC | Age: 26
End: 2021-04-13

## 2021-04-13 NOTE — TELEPHONE ENCOUNTER
Patient had a pap that was negative for lesion or malignancy on 3/11/21. She wanted to know since she had a LEEP when you want her to do another pap? No HPV criteria was not met for this test.  Neg chlamydia, neg gonnorhea, neg trich.       Please advise

## 2021-09-25 ENCOUNTER — HOSPITAL ENCOUNTER (EMERGENCY)
Age: 26
Discharge: HOME | End: 2021-09-25
Attending: EMERGENCY MEDICINE
Payer: OTHER GOVERNMENT

## 2021-09-25 VITALS
BODY MASS INDEX: 21.66 KG/M2 | TEMPERATURE: 98.4 F | HEIGHT: 65 IN | RESPIRATION RATE: 16 BRPM | OXYGEN SATURATION: 97 % | DIASTOLIC BLOOD PRESSURE: 72 MMHG | WEIGHT: 130 LBS | HEART RATE: 96 BPM | SYSTOLIC BLOOD PRESSURE: 106 MMHG

## 2021-09-25 DIAGNOSIS — J02.9 VIRAL PHARYNGITIS: Primary | ICD-10-CM

## 2021-09-25 LAB — S PYO AG THROAT QL: NEGATIVE

## 2021-09-25 PROCEDURE — 99283 EMERGENCY DEPT VISIT LOW MDM: CPT

## 2021-09-25 PROCEDURE — 87070 CULTURE OTHR SPECIMN AEROBIC: CPT

## 2021-09-25 PROCEDURE — 87147 CULTURE TYPE IMMUNOLOGIC: CPT

## 2021-09-25 PROCEDURE — 74011250637 HC RX REV CODE- 250/637: Performed by: EMERGENCY MEDICINE

## 2021-09-25 PROCEDURE — 87880 STREP A ASSAY W/OPTIC: CPT

## 2021-09-25 RX ORDER — ACETAMINOPHEN 500 MG
1000 TABLET ORAL
Status: COMPLETED | OUTPATIENT
Start: 2021-09-25 | End: 2021-09-25

## 2021-09-25 RX ORDER — ACETAMINOPHEN 500 MG
1000 TABLET ORAL 3 TIMES DAILY
Qty: 24 TABLET | Refills: 0 | Status: SHIPPED | OUTPATIENT
Start: 2021-09-25 | End: 2021-09-29

## 2021-09-25 RX ORDER — DEXAMETHASONE SODIUM PHOSPHATE 10 MG/ML
10 INJECTION INTRAMUSCULAR; INTRAVENOUS
Status: COMPLETED | OUTPATIENT
Start: 2021-09-25 | End: 2021-09-25

## 2021-09-25 RX ORDER — IBUPROFEN 400 MG/1
800 TABLET ORAL
Status: COMPLETED | OUTPATIENT
Start: 2021-09-25 | End: 2021-09-25

## 2021-09-25 RX ORDER — IBUPROFEN 600 MG/1
600 TABLET ORAL
Qty: 30 TABLET | Refills: 0 | Status: SHIPPED | OUTPATIENT
Start: 2021-09-25

## 2021-09-25 RX ADMIN — DEXAMETHASONE SODIUM PHOSPHATE 10 MG: 10 INJECTION, SOLUTION INTRAMUSCULAR; INTRAVENOUS at 07:09

## 2021-09-25 RX ADMIN — IBUPROFEN 800 MG: 400 TABLET, FILM COATED ORAL at 07:07

## 2021-09-25 RX ADMIN — ACETAMINOPHEN 1000 MG: 500 TABLET ORAL at 07:07

## 2021-09-25 NOTE — ED PROVIDER NOTES
44-year-old female presenting ER with report of sore throat that started 3 days ago. No reported any fever but reports fatigue and chills. Having some mild myalgias. No runny nose congestion. No ear pain. Reports some tenderness along the upper neck bilaterally. Worsened pain with swallowing and eating. No sore throat. No shortness of breath. No abdominal pain no nausea vomiting or diarrhea. Tried taking over-the-counter medications without improvement. Past Medical History:   Diagnosis Date    Abnormal Pap smear of cervix 2019    colpo & leep    Anxiety and depression 2010    seen psychiatrist, was given meds     Chronic lower back pain 2017    multiple falls while skateboarding       Past Surgical History:   Procedure Laterality Date    HX LEEP PROCEDURE           Family History:   Problem Relation Age of Onset    Schizophrenia Mother     Bipolar Disorder Mother     No Known Problems Father     No Known Problems Sister     No Known Problems Sister     Diabetes Paternal Grandfather        Social History     Socioeconomic History    Marital status: UNKNOWN     Spouse name: Not on file    Number of children: Not on file    Years of education: Not on file    Highest education level: Not on file   Occupational History    Occupation: laser tech, piercing; [employer]   Tobacco Use    Smoking status: Current Every Day Smoker     Packs/day: 0.25     Types: Cigarettes     Last attempt to quit: 10/2018     Years since quittin.9    Smokeless tobacco: Never Used   Vaping Use    Vaping Use: Never used   Substance and Sexual Activity    Alcohol use: Yes     Alcohol/week: 2.0 standard drinks     Types: 2 Cans of beer per week    Drug use: Yes     Frequency: 7.0 times per week     Types: Marijuana    Sexual activity: Yes     Partners: Male, Female     Birth control/protection: I.U.D.      Comment: Mirena 2018   Other Topics Concern    Not on file   Social History Narrative    Not on file     Social Determinants of Health     Financial Resource Strain:     Difficulty of Paying Living Expenses:    Food Insecurity:     Worried About Running Out of Food in the Last Year:     [address] in the Last Year:    Transportation Needs:     Lack of Transportation (Medical):  Lack of Transportation (Non-Medical):    Physical Activity:     Days of Exercise per Week:     Minutes of Exercise per Session:    Stress:     Feeling of Stress :    Social Connections:     Frequency of Communication with Friends and Family:     Frequency of Social Gatherings with Friends and Family:     Attends Advent Services:     Active Member of Clubs or Organizations:     Attends Club or Organization Meetings:     Marital Status:    Intimate Partner Violence:     Fear of Current or Ex-Partner:     Emotionally Abused:     Physically Abused:     Sexually Abused: ALLERGIES: Patient has no known allergies. Review of Systems   Constitutional: Positive for chills and fatigue. Negative for fever. HENT: Positive for sore throat. Negative for congestion, mouth sores, sinus pressure, sinus pain, tinnitus, trouble swallowing and voice change. Eyes: Negative for pain. Respiratory: Negative for shortness of breath. Cardiovascular: Negative for chest pain. Gastrointestinal: Negative for abdominal pain, diarrhea, nausea and vomiting. Genitourinary: Negative for dysuria and flank pain. Musculoskeletal: Negative for back pain and neck pain. Skin: Negative for rash. Neurological: Negative for dizziness and headaches. All other systems reviewed and are negative. Vitals:    09/25/21 0613   BP: 106/72   Pulse: 96   Resp: 16   Temp: 98.4 °F (36.9 °C)   SpO2: 97%   Weight: 59 kg (130 lb)   Height: 5' 5\" (1.651 m)            Physical Exam  Constitutional:       Appearance: She is well-developed. HENT:      Head: Normocephalic. Mouth/Throat:      Lips: Pink. No lesions.       Mouth: Mucous membranes are moist.      Tongue: No lesions. Palate: No lesions. Pharynx: Posterior oropharyngeal erythema present. No uvula swelling. Tonsils: Tonsillar exudate present. No tonsillar abscesses. 2+ on the right. 1+ on the left. Eyes:      Conjunctiva/sclera: Conjunctivae normal.   Cardiovascular:      Rate and Rhythm: Normal rate and regular rhythm. Pulmonary:      Effort: Pulmonary effort is normal. No respiratory distress. Breath sounds: Normal breath sounds. Abdominal:      General: Bowel sounds are normal.      Palpations: Abdomen is soft. There is no splenomegaly. Tenderness: There is no abdominal tenderness. Musculoskeletal:         General: Normal range of motion. Cervical back: Normal range of motion and neck supple. Lymphadenopathy:      Cervical: Cervical adenopathy present. Skin:     General: Skin is warm. Capillary Refill: Capillary refill takes less than 2 seconds. Findings: No rash. Neurological:      Mental Status: She is alert and oriented to person, place, and time. Comments: No gross motor or sensory deficits          MDM  Number of Diagnoses or Management Options  Diagnosis management comments: Patient with sore throat with large tonsils erythema and exudates. Rapid strep test negative. Will throat culture. Possible mono versus viral pharyngitis. Will treat a given dose of steroids and discussed Tylenol Motrin for pain. Discussed the discharge impression and any labs and the results with the patient. Answered any questions and addressed any concerns. Discussed the importance of following up with their primary care provider and/or specialist.  Discussed signs or symptoms that would warrant return back to the ER for further evaluation. The patient is agreeable with discharge.          Amount and/or Complexity of Data Reviewed  Clinical lab tests: reviewed      ED Course as of Sep 25 5444   Sat Sep 25, 2021   4810 Group A strep (POC): Negative [ZD]      ED Course User Index  [ZD] Rossy Mcclure MD       Procedures

## 2021-09-25 NOTE — ED TRIAGE NOTES
Patient arrives to the ED with c/o sore throat, x 3 days, no fever noted, patient and c/o some mid back pain.

## 2021-09-25 NOTE — ED NOTES
RN reviewed discharge instructions with the patient. The patient verbalized understanding. Left ED ambulatory with discharge papers in hand.

## 2021-09-27 LAB
BACTERIA SPEC CULT: ABNORMAL
BACTERIA SPEC CULT: ABNORMAL
SERVICE CMNT-IMP: ABNORMAL

## 2022-03-18 PROBLEM — R87.619 ABNORMAL PAP SMEAR OF CERVIX: Status: ACTIVE | Noted: 2019-01-01

## 2022-03-19 PROBLEM — M54.50 CHRONIC LOWER BACK PAIN: Status: ACTIVE | Noted: 2017-01-01

## 2022-03-19 PROBLEM — G89.29 CHRONIC LOWER BACK PAIN: Status: ACTIVE | Noted: 2017-01-01

## 2023-05-23 RX ORDER — IBUPROFEN 600 MG/1
600 TABLET ORAL EVERY 6 HOURS PRN
COMMUNITY
Start: 2021-09-25

## 2023-09-07 NOTE — TELEPHONE ENCOUNTER
Call received at 357PM    22year old patient last seen in the office on 9/23/19. Patient calling about her lab results. Patient advised of MD reviewed lab results and recommendations. Patient verbalized understanding. Pt.'s oxygen saturation declined to a minimum of 86%, RN notified. Pt. Did not complain of shortness of breath or any trouble breathing. Pt. Was placed on oxygen delivery of 2 L/min via nasal cannula. Shortly after placing the oxygen on the Pt., Pt.'s oxygen saturation increased to 97%, RN notified.

## (undated) DEVICE — GOWN,SIRUS,NONRNF,SETINSLV,XL,20/CS: Brand: MEDLINE

## (undated) DEVICE — INFECTION CONTROL KIT SYS

## (undated) DEVICE — CATH URETH INTMIT ROB 16FR FUN -- CONVERT TO ITEM 179520

## (undated) DEVICE — STERILE POLYISOPRENE POWDER-FREE SURGICAL GLOVES WITH EMOLLIENT COATING: Brand: PROTEXIS

## (undated) DEVICE — DEVON™ KNEE AND BODY STRAP 60" X 3" (1.5 M X 7.6 CM): Brand: DEVON

## (undated) DEVICE — ROCKER SWITCH PENCIL BLADE ELECTRODE, HOLSTER: Brand: EDGE

## (undated) DEVICE — SUTURE SZ 0 27IN 5/8 CIR UR-6  TAPER PT VIOLET ABSRB VICRYL J603H

## (undated) DEVICE — SOLUTION IV 1000ML 0.9% SOD CHL

## (undated) DEVICE — REM POLYHESIVE ADULT PATIENT RETURN ELECTRODE: Brand: VALLEYLAB

## (undated) DEVICE — TOWEL SURG W17XL27IN STD BLU COT NONFENESTRATED PREWASHED

## (undated) DEVICE — SUT SLK 2-0SH 30IN BLK --

## (undated) DEVICE — ELECTRODE ES L11CM DIA3MM BALL TIP BLK DISP UTAHLOOP (MIN BUY OF 10 EACHES)

## (undated) DEVICE — SURGICAL PROCEDURE PACK BASIN MAJ SET CUST NO CAUT

## (undated) DEVICE — ELECTRODE ES L11CM DIA5MM BALL SHFT RED DISP UTAHLOOP

## (undated) DEVICE — TUBING SMK EVAC L6FT DIA7/8IN

## (undated) DEVICE — ELECTRODE ES L11CM L15XW20MM BLU SAFE-T-GAUGE LOOP RND

## (undated) DEVICE — PACK,LITHOTOMY,PK I: Brand: MEDLINE

## (undated) DEVICE — KENDALL SCD EXPRESS SLEEVES, KNEE LENGTH, MEDIUM: Brand: KENDALL SCD

## (undated) DEVICE — HANDLE LT SNAP ON ULT DURABLE LENS FOR TRUMPF ALC DISPOSABLE

## (undated) DEVICE — ELECTRODE ARTHSCP 25X8MM SHFT 11CM MPLR LOOP BRN DISP W/

## (undated) DEVICE — PAD SANIT NPKN 4IN GRD

## (undated) DEVICE — SWAB MEDICATED 8 IN PROCTO